# Patient Record
Sex: MALE | Race: WHITE
[De-identification: names, ages, dates, MRNs, and addresses within clinical notes are randomized per-mention and may not be internally consistent; named-entity substitution may affect disease eponyms.]

---

## 2017-03-16 ENCOUNTER — HOSPITAL ENCOUNTER (EMERGENCY)
Dept: HOSPITAL 1 - ED | Age: 62
Discharge: HOME | End: 2017-03-16
Payer: COMMERCIAL

## 2017-03-16 VITALS — WEIGHT: 227.98 LBS | HEIGHT: 68 IN | BODY MASS INDEX: 34.55 KG/M2

## 2017-03-16 VITALS — SYSTOLIC BLOOD PRESSURE: 116 MMHG | DIASTOLIC BLOOD PRESSURE: 81 MMHG

## 2017-03-16 DIAGNOSIS — I10: ICD-10-CM

## 2017-03-16 DIAGNOSIS — K21.9: ICD-10-CM

## 2017-03-16 DIAGNOSIS — L02.415: Primary | ICD-10-CM

## 2017-08-13 ENCOUNTER — HOSPITAL ENCOUNTER (INPATIENT)
Dept: HOSPITAL 1 - ED | Age: 62
LOS: 5 days | Discharge: HOME | DRG: 380 | End: 2017-08-18
Attending: FAMILY MEDICINE | Admitting: FAMILY MEDICINE
Payer: COMMERCIAL

## 2017-08-13 VITALS — SYSTOLIC BLOOD PRESSURE: 93 MMHG | DIASTOLIC BLOOD PRESSURE: 45 MMHG

## 2017-08-13 VITALS — WEIGHT: 210.54 LBS | HEIGHT: 69 IN | BODY MASS INDEX: 31.18 KG/M2

## 2017-08-13 VITALS — DIASTOLIC BLOOD PRESSURE: 51 MMHG | SYSTOLIC BLOOD PRESSURE: 95 MMHG

## 2017-08-13 VITALS — DIASTOLIC BLOOD PRESSURE: 42 MMHG | SYSTOLIC BLOOD PRESSURE: 80 MMHG

## 2017-08-13 VITALS — DIASTOLIC BLOOD PRESSURE: 69 MMHG | SYSTOLIC BLOOD PRESSURE: 108 MMHG

## 2017-08-13 VITALS — SYSTOLIC BLOOD PRESSURE: 86 MMHG | DIASTOLIC BLOOD PRESSURE: 46 MMHG

## 2017-08-13 DIAGNOSIS — I95.9: ICD-10-CM

## 2017-08-13 DIAGNOSIS — M94.0: ICD-10-CM

## 2017-08-13 DIAGNOSIS — K22.10: Primary | ICD-10-CM

## 2017-08-13 DIAGNOSIS — E83.42: ICD-10-CM

## 2017-08-13 DIAGNOSIS — D50.0: ICD-10-CM

## 2017-08-13 DIAGNOSIS — K26.3: ICD-10-CM

## 2017-08-13 DIAGNOSIS — N17.0: ICD-10-CM

## 2017-08-13 DIAGNOSIS — E87.1: ICD-10-CM

## 2017-08-13 DIAGNOSIS — E83.39: ICD-10-CM

## 2017-08-13 DIAGNOSIS — K21.9: ICD-10-CM

## 2017-08-13 LAB
ALBUMIN SERPL-MCNC: 3.5 G/DL (ref 3.4–5)
ALP SERPL-CCNC: 75 U/L (ref 46–116)
ALT SERPL-CCNC: 40 U/L (ref 16–63)
AMPHETAMINES UR QL SCN: (no result)
AMYLASE SERPL-CCNC: 51 U/L (ref 25–115)
AST SERPL-CCNC: 21 U/L (ref 15–37)
BASOPHILS NFR BLD: 0.2 % (ref 0–2)
BILIRUB SERPL-MCNC: 0.37 MG/DL (ref 0.2–1)
BUN SERPL-MCNC: 98 MG/DL (ref 7–18)
CALCIUM SERPL-MCNC: 7.5 MG/DL (ref 8.5–10.1)
CHLORIDE SERPL-SCNC: 88 MMOL/L (ref 98–107)
CHOLEST SERPL-MCNC: 177 MG/DL (ref ?–200)
CHOLEST/HDLC SERPL: 3.7 MG/DL
CO2 SERPL-SCNC: 12.3 MMOL/L (ref 21–32)
CREAT SERPL-MCNC: 8.9 MG/DL (ref 0.7–1.3)
ERYTHROCYTE [DISTWIDTH] IN BLOOD BY AUTOMATED COUNT: 13.4 % (ref 11.5–14.5)
GFR SERPLBLD BASED ON 1.73 SQ M-ARVRAT: 6 ML/MIN
GLUCOSE SERPL-MCNC: 132 MG/DL (ref 74–106)
HDLC SERPL-MCNC: 48 MG/DL (ref 40–60)
MAGNESIUM SERPL-MCNC: 2.5 MG/DL (ref 1.8–2.4)
MICROSCOPIC UR-IMP: YES
PHOSPHATE SERPL-MCNC: 12.2 MG/DL (ref 2.5–4.9)
PLATELET # BLD: 206 X10^3MCL (ref 130–400)
POTASSIUM SERPL-SCNC: 4.3 MMOL/L (ref 3.5–5.1)
PROT SERPL-MCNC: 7.8 G/DL (ref 6.4–8.2)
RBC # UR STRIP.AUTO: (no result) /UL
SODIUM SERPL-SCNC: 127 MMOL/L (ref 136–145)
T3 SERPL-MCNC: 0.87 NG/ML
T3RU NFR SERPL: 38 % UPTAKE (ref 33–39)
T4 FREE SERPL-MCNC: 1.32 NG/DL (ref 0.76–1.46)
T4 SERPL-MCNC: 8.5 UG/DL (ref 4.7–13.3)
T4/T3 UPTAKE INDEX SERPL: 3.2 UG/DL (ref 1.4–4.5)
TRIGL SERPL-MCNC: 242 MG/DL (ref ?–150)
UA SPECIFIC GRAVITY: 1.02 (ref 1–1.03)

## 2017-08-13 PROCEDURE — C9113 INJ PANTOPRAZOLE SODIUM, VIA: HCPCS

## 2017-08-14 VITALS — DIASTOLIC BLOOD PRESSURE: 80 MMHG | SYSTOLIC BLOOD PRESSURE: 127 MMHG

## 2017-08-14 VITALS — SYSTOLIC BLOOD PRESSURE: 94 MMHG | DIASTOLIC BLOOD PRESSURE: 55 MMHG

## 2017-08-14 VITALS — SYSTOLIC BLOOD PRESSURE: 150 MMHG | DIASTOLIC BLOOD PRESSURE: 83 MMHG

## 2017-08-14 VITALS — SYSTOLIC BLOOD PRESSURE: 104 MMHG | DIASTOLIC BLOOD PRESSURE: 64 MMHG

## 2017-08-14 LAB
BASOPHILS NFR BLD: 0.4 % (ref 0–2)
BUN SERPL-MCNC: 87 MG/DL (ref 7–18)
CALCIUM SERPL-MCNC: 6.9 MG/DL (ref 8.5–10.1)
CHLORIDE SERPL-SCNC: 99 MMOL/L (ref 98–107)
CO2 SERPL-SCNC: 16.3 MMOL/L (ref 21–32)
CREAT SERPL-MCNC: 4 MG/DL (ref 0.7–1.3)
ERYTHROCYTE [DISTWIDTH] IN BLOOD BY AUTOMATED COUNT: 13.5 % (ref 11.5–14.5)
GFR SERPLBLD BASED ON 1.73 SQ M-ARVRAT: 16 ML/MIN
GLUCOSE SERPL-MCNC: 96 MG/DL (ref 74–106)
PLATELET # BLD: 155 X10^3MCL (ref 130–400)
POTASSIUM SERPL-SCNC: 3.7 MMOL/L (ref 3.5–5.1)
SODIUM SERPL-SCNC: 133 MMOL/L (ref 136–145)

## 2017-08-15 VITALS — SYSTOLIC BLOOD PRESSURE: 167 MMHG | DIASTOLIC BLOOD PRESSURE: 96 MMHG

## 2017-08-15 VITALS — DIASTOLIC BLOOD PRESSURE: 84 MMHG | SYSTOLIC BLOOD PRESSURE: 139 MMHG

## 2017-08-15 VITALS — SYSTOLIC BLOOD PRESSURE: 154 MMHG | DIASTOLIC BLOOD PRESSURE: 97 MMHG

## 2017-08-15 VITALS — SYSTOLIC BLOOD PRESSURE: 157 MMHG | DIASTOLIC BLOOD PRESSURE: 95 MMHG

## 2017-08-15 VITALS — DIASTOLIC BLOOD PRESSURE: 84 MMHG | SYSTOLIC BLOOD PRESSURE: 140 MMHG

## 2017-08-15 VITALS — DIASTOLIC BLOOD PRESSURE: 102 MMHG | SYSTOLIC BLOOD PRESSURE: 142 MMHG

## 2017-08-15 LAB
ALBUMIN SERPL-MCNC: 2.7 G/DL (ref 3.4–5)
ALP SERPL-CCNC: 73 U/L (ref 46–116)
ALT SERPL-CCNC: 33 U/L (ref 16–63)
AST SERPL-CCNC: 19 U/L (ref 15–37)
BASOPHILS NFR BLD: 0.5 % (ref 0–2)
BILIRUB SERPL-MCNC: 0.43 MG/DL (ref 0.2–1)
BUN SERPL-MCNC: 55 MG/DL (ref 7–18)
CALCIUM SERPL-MCNC: 8.5 MG/DL (ref 8.5–10.1)
CHLORIDE SERPL-SCNC: 104 MMOL/L (ref 98–107)
CO2 SERPL-SCNC: 25.4 MMOL/L (ref 21–32)
CREAT SERPL-MCNC: 1.1 MG/DL (ref 0.7–1.3)
ERYTHROCYTE [DISTWIDTH] IN BLOOD BY AUTOMATED COUNT: 13.8 % (ref 11.5–14.5)
GFR SERPLBLD BASED ON 1.73 SQ M-ARVRAT: > 60 ML/MIN
GLUCOSE SERPL-MCNC: 108 MG/DL (ref 74–106)
MAGNESIUM SERPL-MCNC: 1.9 MG/DL (ref 1.8–2.4)
PHOSPHATE SERPL-MCNC: 2.5 MG/DL (ref 2.5–4.9)
PLATELET # BLD: 203 X10^3MCL (ref 130–400)
POTASSIUM SERPL-SCNC: 3.2 MMOL/L (ref 3.5–5.1)
PROT SERPL-MCNC: 6.4 G/DL (ref 6.4–8.2)
SODIUM SERPL-SCNC: 141 MMOL/L (ref 136–145)

## 2017-08-15 PROCEDURE — 0DB58ZX EXCISION OF ESOPHAGUS, VIA NATURAL OR ARTIFICIAL OPENING ENDOSCOPIC, DIAGNOSTIC: ICD-10-PCS | Performed by: INTERNAL MEDICINE

## 2017-08-15 PROCEDURE — 0DH68UZ INSERTION OF FEEDING DEVICE INTO STOMACH, VIA NATURAL OR ARTIFICIAL OPENING ENDOSCOPIC: ICD-10-PCS | Performed by: INTERNAL MEDICINE

## 2017-08-15 PROCEDURE — 0DB78ZX EXCISION OF STOMACH, PYLORUS, VIA NATURAL OR ARTIFICIAL OPENING ENDOSCOPIC, DIAGNOSTIC: ICD-10-PCS | Performed by: INTERNAL MEDICINE

## 2017-08-16 VITALS — SYSTOLIC BLOOD PRESSURE: 160 MMHG | DIASTOLIC BLOOD PRESSURE: 79 MMHG

## 2017-08-16 VITALS — DIASTOLIC BLOOD PRESSURE: 84 MMHG | SYSTOLIC BLOOD PRESSURE: 173 MMHG

## 2017-08-16 VITALS — DIASTOLIC BLOOD PRESSURE: 60 MMHG | SYSTOLIC BLOOD PRESSURE: 131 MMHG

## 2017-08-16 VITALS — DIASTOLIC BLOOD PRESSURE: 92 MMHG | SYSTOLIC BLOOD PRESSURE: 169 MMHG

## 2017-08-16 VITALS — SYSTOLIC BLOOD PRESSURE: 170 MMHG | DIASTOLIC BLOOD PRESSURE: 96 MMHG

## 2017-08-16 LAB
BASOPHILS NFR BLD: 0 % (ref 0–2)
BUN SERPL-MCNC: 26 MG/DL (ref 7–18)
CALCIUM SERPL-MCNC: 8.5 MG/DL (ref 8.5–10.1)
CHLORIDE SERPL-SCNC: 107 MMOL/L (ref 98–107)
CO2 SERPL-SCNC: 28.9 MMOL/L (ref 21–32)
CREAT SERPL-MCNC: 0.9 MG/DL (ref 0.7–1.3)
ERYTHROCYTE [DISTWIDTH] IN BLOOD BY AUTOMATED COUNT: 13.8 % (ref 11.5–14.5)
GFR SERPLBLD BASED ON 1.73 SQ M-ARVRAT: > 60 ML/MIN
GLUCOSE SERPL-MCNC: 118 MG/DL (ref 74–106)
MAGNESIUM SERPL-MCNC: 1.6 MG/DL (ref 1.8–2.4)
METAMYELOCYTES NFR BLD: 1 % (ref 0–2)
MONOCYTES NFR BLD: 24 % (ref 0–7)
MYELOCYTES NFR BLD: 2 % (ref 0–2)
NEUTS BAND NFR BLD: 7 % (ref 0–10)
NEUTS SEG NFR BLD MANUAL: 41 % (ref 37–75)
PHOSPHATE SERPL-MCNC: 1.6 MG/DL (ref 2.5–4.9)
PLAT MORPH BLD: (no result)
PLATELET # BLD: 219 X10^3MCL (ref 130–400)
POTASSIUM SERPL-SCNC: 3.4 MMOL/L (ref 3.5–5.1)
RBC MORPH BLD: (no result)
SODIUM SERPL-SCNC: 144 MMOL/L (ref 136–145)

## 2017-08-17 VITALS — DIASTOLIC BLOOD PRESSURE: 90 MMHG | SYSTOLIC BLOOD PRESSURE: 164 MMHG

## 2017-08-17 VITALS — SYSTOLIC BLOOD PRESSURE: 179 MMHG | DIASTOLIC BLOOD PRESSURE: 100 MMHG

## 2017-08-17 VITALS — SYSTOLIC BLOOD PRESSURE: 134 MMHG | DIASTOLIC BLOOD PRESSURE: 74 MMHG

## 2017-08-17 VITALS — SYSTOLIC BLOOD PRESSURE: 149 MMHG | DIASTOLIC BLOOD PRESSURE: 83 MMHG

## 2017-08-17 LAB
BASOPHILS NFR BLD: 0.3 % (ref 0–2)
BUN SERPL-MCNC: 19 MG/DL (ref 7–18)
CALCIUM SERPL-MCNC: 8.2 MG/DL (ref 8.5–10.1)
CHLORIDE SERPL-SCNC: 107 MMOL/L (ref 98–107)
CO2 SERPL-SCNC: 25.5 MMOL/L (ref 21–32)
CREAT SERPL-MCNC: 0.9 MG/DL (ref 0.7–1.3)
ERYTHROCYTE [DISTWIDTH] IN BLOOD BY AUTOMATED COUNT: 13.8 % (ref 11.5–14.5)
GFR SERPLBLD BASED ON 1.73 SQ M-ARVRAT: > 60 ML/MIN
GLUCOSE SERPL-MCNC: 131 MG/DL (ref 74–106)
MAGNESIUM SERPL-MCNC: 1.4 MG/DL (ref 1.8–2.4)
PHOSPHATE SERPL-MCNC: 2.2 MG/DL (ref 2.5–4.9)
PLATELET # BLD: 228 X10^3MCL (ref 130–400)
POTASSIUM SERPL-SCNC: 3.4 MMOL/L (ref 3.5–5.1)
SODIUM SERPL-SCNC: 144 MMOL/L (ref 136–145)

## 2017-08-18 VITALS — DIASTOLIC BLOOD PRESSURE: 83 MMHG | SYSTOLIC BLOOD PRESSURE: 134 MMHG

## 2017-08-18 VITALS — DIASTOLIC BLOOD PRESSURE: 71 MMHG | SYSTOLIC BLOOD PRESSURE: 140 MMHG

## 2017-08-18 VITALS — DIASTOLIC BLOOD PRESSURE: 67 MMHG | SYSTOLIC BLOOD PRESSURE: 120 MMHG

## 2017-08-18 LAB
BASOPHILS NFR BLD: 0.2 % (ref 0–2)
BUN SERPL-MCNC: 13 MG/DL (ref 7–18)
CALCIUM SERPL-MCNC: 8.1 MG/DL (ref 8.5–10.1)
CHLORIDE SERPL-SCNC: 103 MMOL/L (ref 98–107)
CO2 SERPL-SCNC: 25.8 MMOL/L (ref 21–32)
CREAT SERPL-MCNC: 0.8 MG/DL (ref 0.7–1.3)
ERYTHROCYTE [DISTWIDTH] IN BLOOD BY AUTOMATED COUNT: 13.9 % (ref 11.5–14.5)
GFR SERPLBLD BASED ON 1.73 SQ M-ARVRAT: > 60 ML/MIN
GLUCOSE SERPL-MCNC: 96 MG/DL (ref 74–106)
PLATELET # BLD: 200 X10^3MCL (ref 130–400)
POTASSIUM SERPL-SCNC: 3.6 MMOL/L (ref 3.5–5.1)
SODIUM SERPL-SCNC: 134 MMOL/L (ref 136–145)

## 2017-08-19 LAB
CK BB CFR SERPL ELPH: 0 %
CK MB CFR SERPL ELPH: 0 % (ref 0–3)
CK MM CFR SERPL ELPH: 100 % (ref 97–100)
MACRO TYPE 1: 0 %
MACRO TYPE 2: 0 %

## 2017-08-28 ENCOUNTER — HOSPITAL ENCOUNTER (INPATIENT)
Dept: HOSPITAL 1 - ED | Age: 62
LOS: 10 days | Discharge: ANOTHER HOSPITAL | DRG: 871 | End: 2017-09-07
Attending: FAMILY MEDICINE | Admitting: FAMILY MEDICINE
Payer: COMMERCIAL

## 2017-08-28 VITALS
BODY MASS INDEX: 31.35 KG/M2 | BODY MASS INDEX: 31.35 KG/M2 | HEIGHT: 69 IN | WEIGHT: 211.64 LBS | HEIGHT: 69 IN | WEIGHT: 211.64 LBS

## 2017-08-28 DIAGNOSIS — A41.01: Primary | ICD-10-CM

## 2017-08-28 DIAGNOSIS — N10: ICD-10-CM

## 2017-08-28 DIAGNOSIS — D47.3: ICD-10-CM

## 2017-08-28 DIAGNOSIS — I37.1: ICD-10-CM

## 2017-08-28 DIAGNOSIS — F11.21: ICD-10-CM

## 2017-08-28 DIAGNOSIS — G06.2: ICD-10-CM

## 2017-08-28 DIAGNOSIS — I51.81: ICD-10-CM

## 2017-08-28 DIAGNOSIS — E78.5: ICD-10-CM

## 2017-08-28 DIAGNOSIS — D73.89: ICD-10-CM

## 2017-08-28 DIAGNOSIS — K76.0: ICD-10-CM

## 2017-08-28 DIAGNOSIS — I10: ICD-10-CM

## 2017-08-28 DIAGNOSIS — M46.26: ICD-10-CM

## 2017-08-28 DIAGNOSIS — E43: ICD-10-CM

## 2017-08-28 DIAGNOSIS — I27.2: ICD-10-CM

## 2017-08-28 DIAGNOSIS — M48.54XA: ICD-10-CM

## 2017-08-28 DIAGNOSIS — B96.5: ICD-10-CM

## 2017-08-28 DIAGNOSIS — Z96.652: ICD-10-CM

## 2017-08-28 DIAGNOSIS — R65.20: ICD-10-CM

## 2017-08-28 DIAGNOSIS — I07.1: ICD-10-CM

## 2017-08-28 DIAGNOSIS — N17.0: ICD-10-CM

## 2017-08-28 DIAGNOSIS — N28.1: ICD-10-CM

## 2017-08-28 DIAGNOSIS — N13.30: ICD-10-CM

## 2017-08-28 DIAGNOSIS — D64.9: ICD-10-CM

## 2017-08-28 DIAGNOSIS — E87.6: ICD-10-CM

## 2017-08-28 DIAGNOSIS — M51.35: ICD-10-CM

## 2017-08-28 DIAGNOSIS — E87.1: ICD-10-CM

## 2017-08-28 PROCEDURE — C1769 GUIDE WIRE: HCPCS

## 2017-08-28 PROCEDURE — C1894 INTRO/SHEATH, NON-LASER: HCPCS

## 2017-08-28 PROCEDURE — C1887 CATHETER, GUIDING: HCPCS

## 2017-08-28 PROCEDURE — A9579 GAD-BASE MR CONTRAST NOS,1ML: HCPCS

## 2017-08-29 VITALS — SYSTOLIC BLOOD PRESSURE: 141 MMHG | DIASTOLIC BLOOD PRESSURE: 98 MMHG

## 2017-08-29 VITALS — DIASTOLIC BLOOD PRESSURE: 106 MMHG | SYSTOLIC BLOOD PRESSURE: 164 MMHG

## 2017-08-29 VITALS — DIASTOLIC BLOOD PRESSURE: 98 MMHG | SYSTOLIC BLOOD PRESSURE: 153 MMHG

## 2017-08-29 VITALS — SYSTOLIC BLOOD PRESSURE: 157 MMHG | DIASTOLIC BLOOD PRESSURE: 91 MMHG

## 2017-08-29 VITALS — SYSTOLIC BLOOD PRESSURE: 147 MMHG | DIASTOLIC BLOOD PRESSURE: 77 MMHG

## 2017-08-29 VITALS — SYSTOLIC BLOOD PRESSURE: 178 MMHG | DIASTOLIC BLOOD PRESSURE: 118 MMHG

## 2017-08-29 LAB
ALBUMIN SERPL-MCNC: 2.2 G/DL (ref 3.4–5)
ALP SERPL-CCNC: 220 U/L (ref 46–116)
ALT SERPL-CCNC: 27 U/L (ref 16–63)
AMPHETAMINES UR QL SCN: (no result)
AMYLASE SERPL-CCNC: 28 U/L (ref 25–115)
AST SERPL-CCNC: 32 U/L (ref 15–37)
BASOPHILS NFR BLD: 0.2 % (ref 0–2)
BILIRUB SERPL-MCNC: 0.6 MG/DL (ref 0.2–1)
BUN SERPL-MCNC: 20 MG/DL (ref 7–18)
BUN SERPL-MCNC: 22 MG/DL (ref 7–18)
CALCIUM SERPL-MCNC: 8.7 MG/DL (ref 8.5–10.1)
CALCIUM SERPL-MCNC: 8.9 MG/DL (ref 8.5–10.1)
CHLORIDE SERPL-SCNC: 98 MMOL/L (ref 98–107)
CHLORIDE SERPL-SCNC: 98 MMOL/L (ref 98–107)
CHOLEST SERPL-MCNC: 107 MG/DL (ref ?–200)
CHOLEST/HDLC SERPL: 17.8 MG/DL
CO2 SERPL-SCNC: 28.3 MMOL/L (ref 21–32)
CO2 SERPL-SCNC: 30.2 MMOL/L (ref 21–32)
CREAT SERPL-MCNC: 0.8 MG/DL (ref 0.7–1.3)
CREAT SERPL-MCNC: 0.9 MG/DL (ref 0.7–1.3)
ERYTHROCYTE [DISTWIDTH] IN BLOOD BY AUTOMATED COUNT: 14.9 % (ref 11.5–14.5)
ERYTHROCYTE [DISTWIDTH] IN BLOOD BY AUTOMATED COUNT: 15.2 % (ref 11.5–14.5)
GFR SERPLBLD BASED ON 1.73 SQ M-ARVRAT: > 60 ML/MIN
GFR SERPLBLD BASED ON 1.73 SQ M-ARVRAT: > 60 ML/MIN
GLUCOSE SERPL-MCNC: 136 MG/DL (ref 74–106)
GLUCOSE SERPL-MCNC: 161 MG/DL (ref 74–106)
HDLC SERPL-MCNC: 6 MG/DL (ref 40–60)
IRON SERPL-MCNC: 13 UG/DL (ref 65–170)
LIPASE SERPL-CCNC: 77 IU/L (ref 73–393)
MAGNESIUM SERPL-MCNC: 1.9 MG/DL (ref 1.8–2.4)
MICROSCOPIC UR-IMP: YES
NEUTS BAND NFR BLD: 5 % (ref 0–10)
NEUTS SEG NFR BLD MANUAL: 91 % (ref 37–75)
PHOSPHATE SERPL-MCNC: 3 MG/DL (ref 2.5–4.9)
PLAT MORPH BLD: (no result)
PLATELET # BLD: 641 X10^3MCL (ref 130–400)
PLATELET # BLD: 764 X10^3MCL (ref 130–400)
POTASSIUM SERPL-SCNC: 3.1 MMOL/L (ref 3.5–5.1)
POTASSIUM SERPL-SCNC: 3.7 MMOL/L (ref 3.5–5.1)
PROT SERPL-MCNC: 6.8 G/DL (ref 6.4–8.2)
RBC # UR STRIP.AUTO: NEGATIVE /UL
RBC MORPH BLD: (no result)
SODIUM SERPL-SCNC: 133 MMOL/L (ref 136–145)
SODIUM SERPL-SCNC: 137 MMOL/L (ref 136–145)
T3 SERPL-MCNC: 0.82 NG/ML
T3RU NFR SERPL: 40 % UPTAKE (ref 33–39)
T4 FREE SERPL-MCNC: 2.02 NG/DL (ref 0.76–1.46)
T4 SERPL-MCNC: 11 UG/DL (ref 4.7–13.3)
T4/T3 UPTAKE INDEX SERPL: 4.4 UG/DL (ref 1.4–4.5)
TIBC SERPL-MCNC: 148 UG/DL (ref 250–450)
TRIGL SERPL-MCNC: 183 MG/DL (ref ?–150)
UA SPECIFIC GRAVITY: 1.02 (ref 1–1.03)

## 2017-08-29 NOTE — NUR
REPORT GIVEN TO FLOOR RN, ALL QUESTIONS ASKED AND ANSWERED PT TO TRANSFER TO
ROOM 256 B IN STABLE CONDITION.

## 2017-08-29 NOTE — NUR
RECEIVED PT. FROM ER VIA JESSICA ACCOMPANIED BY ER NURSE. PT. IS AWAKE, ALERT,
ORIENTED X4. DENIES HEADACHE OR DIZZINESS. BREATH SOUNDS CLEAR THROUGHOUT LUNG
HERRERA, RESP. EVEN, UNLABORED. NO SOB NOTED. DENIES CHESTPAIN, ST. ON MONITOR
#13. NO EDEMA NOTED TO EXTREMITIES. PEDAL PULSES STRONG SCOOBY. ABD. SOFT AND
ROUND, C/O SCOOBY FLANK PAIN, ALSO C/O PAIN THAT EXTENDS DOWN HIS LEG. PRN
MORPHINE GIVEN. WILL MONITOR. IVF NS STARTED AT 100CC/HR. CALL LIGHT PLACED
WITHIN REACH. PT. MADE AWARE NEED FOR URINE SPECIMEN.

## 2017-08-29 NOTE — NUR
PT TO ED FOR C/O RIGHT FLANK PAIN WITH N/V SINCE YESTERDAY, PT STATES THAT PAIN
SHOOTS DOWN BOTH LEGS AND IT FEELS LIKE A SHOOTING PAIN, PT DENIES ANY INUST OT
AREA, PT AAX4 RESP E/U, PT IN NAD NOTED AT THIS TIME

## 2017-08-29 NOTE — NUR
REC'D PT FROM DAY NURSE. PT AAOX4, SPEECH CLEAR, FOLLOWS COMMANDS. C/O SHARP
LOWER BACK PAIN 10/10. MORPHINE 4MG IVP GIVEN BY DAY NURSE. ALSO C/O SHARP
STERNAL CP. ON TELE 13. DENIES DIZZINESS OR PALPITATIONS. NO EDEMA NOTED.
DENIES RESP DISTRESS OR SOB. BREATHING EVEN/UNLABORED ON RA. DENIES ABD PAIN
BUT REPORTS TENDERNESS WITH PALPATION. BOWEL SOUNDS ACTIVE. DENIES N/V. LAST
BM TODAY BUT SMALL/HARD. VOIDING FREELY. GENERALIZED WEAKNESS. SKIN INTACT.
CALL LIGHT WITHIN REACH, BED AT LOWEST POSITION. WILL CONTINUE TO MONITOR.

## 2017-08-29 NOTE — NUR
ASSUMING CARE OF PT. AT THIS TIME. PT AWAKE ALERT AND ORIENTED X 4. C/O LOW
BACK AND SPINE PAIN X 5 DAYS WITH PAIN RADIATING DOWN BILATERAL LEGS. PULSE
MOTOR SENSORY NORMAL TO BILAT LEGS AND FEET. PT CURRENTLY IN POSITION OF
COMFORT. AT THIS TIME.

## 2017-08-29 NOTE — NUR
PT. RECEIVED A TOTAL OF 4MG IVP MORPHINE AND STILL STATES THAT HIS PAIN LEVEL
IS BARELY TOUCHED. HE IS STILL GROANING AND VERY RESTLESS IN THE BED TRYING TO
GET COMFORTABLE. PRN TORADOL ALSO ORDERED AND GIVEN. WILL MONITOR.

## 2017-08-29 NOTE — NUR
HEPARIN INFUSION STARTED PER ORDER. LOADING DOSE 5300 UNITS GIVEN AND INFUSION
STARTED @ 100 UNITS/HR PER PROTOCOL. NEXT PTT ORDERED 8/30 @ 0450. PT EDUCATED
REGARDING THERAPEUTIC BENEFITS AND SIDE EFFECTS. PT VERBALIZED UNDERSTANDING.
WILL CONTINUE TO MONITOR.

## 2017-08-29 NOTE — NUR
PT TRANSFERED TO ROOM 256 B AFTER NEW IV STARTED BY ENA YUNG PT ACCIDENTALLY
PULLED OLD LINE OUT. PT TRANSFERED VIA GURNEY ON MONITOR W/ RN ON MONITOR.

## 2017-08-29 NOTE — NUR
DR. SWANSON MADE AWARE OF TROPONIN 10.118. STAT EKG ORDERED. ALSO INQUIRED IF PT
WOULD STILL BE NPO AND ASKED IF HE WOULD LIKE TO SWITCH FLUIDS IF PT REMAINS
NPO. NO CHANGES IN ORDERS REGARDING DIET AND IVF.

## 2017-08-29 NOTE — NUR
PT ON BED, AWAKE, ALERT, AND ORIENTED. HAS NO COMPLAINT OF PAIN, SOB, OR
DIZZINESS.RESPONDS WELL TO QUESTION AND ANSWER. CLEAR SCOOBY LUNG FIELD,
SYMMETRICAL CHEST EXPANSION AND UNLABORED. ACTIVE BOWEL SOUNDS NOTED. CLEAR
SCOOBY LUNG FIELD, SYMMETRICAL CHEST EXPANSION AND UNLABORED.

## 2017-08-30 VITALS — SYSTOLIC BLOOD PRESSURE: 135 MMHG | DIASTOLIC BLOOD PRESSURE: 102 MMHG

## 2017-08-30 VITALS — SYSTOLIC BLOOD PRESSURE: 147 MMHG | DIASTOLIC BLOOD PRESSURE: 102 MMHG

## 2017-08-30 VITALS — DIASTOLIC BLOOD PRESSURE: 97 MMHG | SYSTOLIC BLOOD PRESSURE: 134 MMHG

## 2017-08-30 VITALS — DIASTOLIC BLOOD PRESSURE: 89 MMHG | SYSTOLIC BLOOD PRESSURE: 123 MMHG

## 2017-08-30 VITALS — DIASTOLIC BLOOD PRESSURE: 99 MMHG | SYSTOLIC BLOOD PRESSURE: 139 MMHG

## 2017-08-30 LAB
BASOPHILS NFR BLD: 0 % (ref 0–2)
BUN SERPL-MCNC: 17 MG/DL (ref 7–18)
BUN SERPL-MCNC: 17 MG/DL (ref 7–18)
CALCIUM SERPL-MCNC: 8.2 MG/DL (ref 8.5–10.1)
CALCIUM SERPL-MCNC: 8.3 MG/DL (ref 8.5–10.1)
CHLORIDE SERPL-SCNC: 93 MMOL/L (ref 98–107)
CHLORIDE SERPL-SCNC: 93 MMOL/L (ref 98–107)
CO2 SERPL-SCNC: 28 MMOL/L (ref 21–32)
CO2 SERPL-SCNC: 31.5 MMOL/L (ref 21–32)
CREAT SERPL-MCNC: 0.8 MG/DL (ref 0.7–1.3)
CREAT SERPL-MCNC: 0.8 MG/DL (ref 0.7–1.3)
ERYTHROCYTE [DISTWIDTH] IN BLOOD BY AUTOMATED COUNT: 14.9 % (ref 11.5–14.5)
GFR SERPLBLD BASED ON 1.73 SQ M-ARVRAT: > 60 ML/MIN
GFR SERPLBLD BASED ON 1.73 SQ M-ARVRAT: > 60 ML/MIN
GLUCOSE SERPL-MCNC: 122 MG/DL (ref 74–106)
GLUCOSE SERPL-MCNC: 130 MG/DL (ref 74–106)
PLATELET # BLD: 541 X10^3MCL (ref 130–400)
POTASSIUM SERPL-SCNC: 2.5 MMOL/L (ref 3.5–5.1)
POTASSIUM SERPL-SCNC: 3 MMOL/L (ref 3.5–5.1)
SODIUM SERPL-SCNC: 133 MMOL/L (ref 136–145)
SODIUM SERPL-SCNC: 133 MMOL/L (ref 136–145)

## 2017-08-30 NOTE — NUR
PT HAD RUN OF RICCI AND OCCASIONAL PAC'S. K 2.5. DR. SWANSON AWARE. ORDERS FOR
POTASSIUM PO AND IV RECEIVED. PT RESTING QUIETLY IN BED WITH EYES CLOSED.

## 2017-08-30 NOTE — NUR
PTT 44.1, REBOLUSED 3600UNITS, INCREASED RATE TO 1300UNITS/HR PER HEPARIN
PROTOCOL. VERIFIED WITH CORINNE YUNG. NEXT PTT ORDERED FOR 1400. NO ACTIVE
BLEEDING NOTED. WILL CONTINUE TO MONITOR.

## 2017-08-30 NOTE — NUR
PT C/O LOWER BACK PAIN 10/10. MORPHINE GIVEN PER ORDER. ALSO REPORTS SHARP CP
WHEN LAYING ON LEFT SIDE BUT DIMINISHES WHEN LAYING ON BACK. WILL CONTINUE TO
MONITOR.

## 2017-08-30 NOTE — NUR
AOX4. TELE #13, SR WITH INVERTED T-WAVE. LUNGS CLEAR AND UNLABORED ON NC @ 2L.
RADIAL AND PEDAL PULSES PALPABLE, NO EDEMA. BOWEL SOUNDS ACTIVE X 4 QUADRANTS,
DENIES NAUSEA. SCABS NOTED TO LEFT KNEE AND BUE. DENIES PAIN AT THIS TIME. NS
INFUSING TO LEFT FA @ 100 ML/HR, NO REDNESS OR SWELLING. SALINE LOCKED TO
RIGHT WRIST. HEPARIN DRIP INFUSING AT 1300 UNITS/HR, NEXT PTT DRAW AT 2015.
BED IN LOW POSITION, CALL LIGHT IN REACH. INSTRUCTED TO CALL FOR ASSISTANCE.

## 2017-08-30 NOTE — NUR
PT IS SLEEPING IN BED IN NO ACUTE DISTRESS. RESPIRATIONS EVEN AND UNLABORED ON
RA. NO PAIN NOTED AT THIS TIME. IVF AND HEPARIN RUNNING AT BEDSIDE. CALL LIGHT
WITHIN REACH. BED IN LOWEST POSITION. WILL CONTINUE TO MONITOR.

## 2017-08-30 NOTE — NUR
PT RESTING IN BED C/O BACK PAIN 8/10. NORCO GIVEN PER ORDER. NO RESP
DISTRESS OR SOB. STILL REPORTS CP WITH MOVEMENT, TOLERABLE AT THIS TIME. PTT
48.6. HEPARIN INFUSION RESUMES AT 1100 UNITS/HR PER PROTOCOL. NEXT PTT @ 0850.
, NO COVERAGE. PT REMAINS NPO FOR POSSIBLE INTERVENTION. WILL ENDORSE TO
DAY NURSE.

## 2017-08-30 NOTE — NUR
PT IS SITTING UP IN BED IN NO ACUTE DISTRESS. GIVEN MORPHINE FOR STERNAL AND
BACK PAIN. HEPARIN AND IVF INFUSING AT BEDSIDE. BED IS IN LOWEST POSITION.
CALL LIGHT WITHIN REACH. WILL ENDORSE TO NIGHT NURSE.

## 2017-08-30 NOTE — NUR
PTT 50.8. NO CHANGES TO HEPARIN INFUSION PER HEPARIN PROTOCOL. STILL INFUSING
AT 1300 UNITS/HR. NEXT PTT ORDERED FOR 2015. NO SIGNS OF ACTIVE BLEEDING
NOTED. WILL CONTINUE TO MONITOR.

## 2017-08-30 NOTE — NUR
PT RECEIVED FROM NIGHT NURSE IN NO ACUTE DISTRESS. RESPIRATIONS EVEN AND
UNLABORED ON RA. PT IS AAOX4, STATES PAIN TO BACK AND STERNUM IS TOLERABLE AT
THIS TIME. IVF AND HEPARIN RUNNING AT BEDSIDE. BED IN LOWEST POSITION, CALL
LIGHT WITHIN REACH. WILL CONTINUE TO MONITOR.

## 2017-08-31 VITALS — DIASTOLIC BLOOD PRESSURE: 99 MMHG | SYSTOLIC BLOOD PRESSURE: 142 MMHG

## 2017-08-31 VITALS — DIASTOLIC BLOOD PRESSURE: 55 MMHG | SYSTOLIC BLOOD PRESSURE: 108 MMHG

## 2017-08-31 VITALS — SYSTOLIC BLOOD PRESSURE: 108 MMHG | DIASTOLIC BLOOD PRESSURE: 101 MMHG

## 2017-08-31 VITALS — DIASTOLIC BLOOD PRESSURE: 82 MMHG | SYSTOLIC BLOOD PRESSURE: 127 MMHG

## 2017-08-31 VITALS — DIASTOLIC BLOOD PRESSURE: 73 MMHG | SYSTOLIC BLOOD PRESSURE: 113 MMHG

## 2017-08-31 VITALS — DIASTOLIC BLOOD PRESSURE: 94 MMHG | SYSTOLIC BLOOD PRESSURE: 148 MMHG

## 2017-08-31 LAB
BASOPHILS NFR BLD: 0 % (ref 0–2)
BUN SERPL-MCNC: 24 MG/DL (ref 7–18)
CALCIUM SERPL-MCNC: 8.4 MG/DL (ref 8.5–10.1)
CHLORIDE SERPL-SCNC: 96 MMOL/L (ref 98–107)
CO2 SERPL-SCNC: 27 MMOL/L (ref 21–32)
CREAT SERPL-MCNC: 1 MG/DL (ref 0.7–1.3)
ERYTHROCYTE [DISTWIDTH] IN BLOOD BY AUTOMATED COUNT: 15.5 % (ref 11.5–14.5)
GFR SERPLBLD BASED ON 1.73 SQ M-ARVRAT: > 60 ML/MIN
GLUCOSE SERPL-MCNC: 104 MG/DL (ref 74–106)
MONOCYTES NFR BLD: 5 % (ref 0–7)
NEUTS BAND NFR BLD: 4 % (ref 0–10)
NEUTS SEG NFR BLD MANUAL: 83 % (ref 37–75)
PLAT MORPH BLD: (no result)
PLATELET # BLD: 452 X10^3MCL (ref 130–400)
POTASSIUM SERPL-SCNC: 3.6 MMOL/L (ref 3.5–5.1)
RBC MORPH BLD: (no result)
SODIUM SERPL-SCNC: 133 MMOL/L (ref 136–145)
TARGETS BLD QL SMEAR: (no result)

## 2017-08-31 NOTE — NUR
RECEIVED PT IN NO ACUTE DISTRESS. PT SLEEPING IN BED, NO PAIN NOTED AT THIS
TIME. IVF AND HEPARIN RUNNING AT BEDSIDE. BED IN LOWEST POSITION. CALL LIGHT
WITHIN REACH. WILL CONTINUE TO MONITOR.

## 2017-08-31 NOTE — NUR
PT CURRENTLY RESTING IN BED, NO ACUTE DISTRESS. A/O X4. TELE #13 SHOWING
SINUS RHYTHM WITH PAC'S AND PVC'S, DENIES CHEST PAIN AT THIS TIME. PULSES
PALPABLE IN ALL EXTREMITIES, NO EDEMA NOTED. LUNG SOUNDS CTA BILATERALLY,
NO RESPIRATORY DISTRESS NOTED. BOWEL SOUNDS ACTIVE, LAST BM 8/29/17. VOIDING
WELL. MILD GENERALIZED WEAKNESS NOTED. MULTIPLE DRY SCABS TO BUE AND BLE.
DENIES PAIN AT THIS TIME. IV PATENT AND INTACT. BED IN LOWEST POSITION, SIDE
RAILS UP X2, CALL LIGHT WITHIN REACH. WILL CONTINUE TO MONITOR.

## 2017-08-31 NOTE — NUR
PTT WAS 38.2. REBOLUSED 3600 UNITS. HEPARIN DRIP INCREASED TO 1500 UNITS/HR
PER HEPARIN PROTOCOL. NEXT PTT ORDERED FOR 1230. VERIFIED BY CORINNE YUNG. NO
SIGNS OF ACTIVE BLEEDING. WILL CONTINUE TO MONITOR.

## 2017-08-31 NOTE — NUR
PT RESUMED ON HEPARIN DRIP PROTOCOL PER DR YANES, RUNNING AT 1500 UNITS/HR.
NEXT PTT ORDERED FOR 1700. NO ACTIVE BLEEDING NOTED. DILAUDID GIVEN AS
SCHEDULED.

## 2017-08-31 NOTE — NUR
PT SITTING UP IN BED IN NO ACUTE DISTRESS, APPEARS COMFORTABLE AT THIS TIME.
AAOX4. RESPIRATIONS EVEN AND UNLABORED ON 2L O2 VIA NC. IVF AND HEPARIN
RUNNING AT BEDSIDE. BED IN LOWEST POSITION, CALL LIGHT WITHIN REACH. WILL
CONTINUE TO MONITOR.

## 2017-08-31 NOTE — NUR
PT SITTING UP IN BED IN NO ACUTE DISTRESS. AAOX4. MORPHINE GIVEN FOR PAIN TO
HIPS, BACK, AND STERNUM. IVF AND HEPARIN RUNNING AT BEDSIDE. BED IN LOWEST
POSITION. CALL LIGHT WITHIN REACH. WILL ENDORSE TO NIGHT NURSE.

## 2017-09-01 VITALS — SYSTOLIC BLOOD PRESSURE: 128 MMHG | DIASTOLIC BLOOD PRESSURE: 84 MMHG

## 2017-09-01 VITALS — DIASTOLIC BLOOD PRESSURE: 70 MMHG | SYSTOLIC BLOOD PRESSURE: 105 MMHG

## 2017-09-01 VITALS — DIASTOLIC BLOOD PRESSURE: 71 MMHG | SYSTOLIC BLOOD PRESSURE: 108 MMHG

## 2017-09-01 VITALS — SYSTOLIC BLOOD PRESSURE: 128 MMHG | DIASTOLIC BLOOD PRESSURE: 91 MMHG

## 2017-09-01 VITALS — SYSTOLIC BLOOD PRESSURE: 103 MMHG | DIASTOLIC BLOOD PRESSURE: 64 MMHG

## 2017-09-01 LAB
BASOPHILS NFR BLD: 0 % (ref 0–2)
BUN SERPL-MCNC: 26 MG/DL (ref 7–18)
BURR CELLS BLD QL SMEAR: (no result)
CALCIUM SERPL-MCNC: 8.2 MG/DL (ref 8.5–10.1)
CHLORIDE SERPL-SCNC: 96 MMOL/L (ref 98–107)
CO2 SERPL-SCNC: 29.9 MMOL/L (ref 21–32)
CREAT SERPL-MCNC: 1.1 MG/DL (ref 0.7–1.3)
ERYTHROCYTE [DISTWIDTH] IN BLOOD BY AUTOMATED COUNT: 15.1 % (ref 11.5–14.5)
GFR SERPLBLD BASED ON 1.73 SQ M-ARVRAT: > 60 ML/MIN
GLUCOSE SERPL-MCNC: 104 MG/DL (ref 74–106)
MONOCYTES NFR BLD: 5 % (ref 0–7)
NEUTS BAND NFR BLD: 2 % (ref 0–10)
NEUTS SEG NFR BLD MANUAL: 84 % (ref 37–75)
PLAT MORPH BLD: (no result)
PLATELET # BLD: 386 X10^3MCL (ref 130–400)
POTASSIUM SERPL-SCNC: 3.5 MMOL/L (ref 3.5–5.1)
RBC MORPH BLD: (no result)
SODIUM SERPL-SCNC: 132 MMOL/L (ref 136–145)
TARGETS BLD QL SMEAR: (no result)

## 2017-09-01 PROCEDURE — B2111ZZ FLUOROSCOPY OF MULTIPLE CORONARY ARTERIES USING LOW OSMOLAR CONTRAST: ICD-10-PCS | Performed by: INTERNAL MEDICINE

## 2017-09-01 PROCEDURE — 4A023N7 MEASUREMENT OF CARDIAC SAMPLING AND PRESSURE, LEFT HEART, PERCUTANEOUS APPROACH: ICD-10-PCS | Performed by: INTERNAL MEDICINE

## 2017-09-01 PROCEDURE — B3101ZZ FLUOROSCOPY OF THORACIC AORTA USING LOW OSMOLAR CONTRAST: ICD-10-PCS | Performed by: INTERNAL MEDICINE

## 2017-09-01 PROCEDURE — B2151ZZ FLUOROSCOPY OF LEFT HEART USING LOW OSMOLAR CONTRAST: ICD-10-PCS | Performed by: INTERNAL MEDICINE

## 2017-09-01 NOTE — NUR
ASPIRATED THE REST OF 6 ML OF AIR REMAINING.NO BLEEDING NOTED ON THE
SITE.INSTRUCTED PT TO NOTIFY AT ONCE IF BLEEDING NOTED.WILL CONTINUE TO
MONITOR PT.

## 2017-09-01 NOTE — NUR
PT SLEPT PERIODICALLY THROUGHOUT NIGHT, NO ACUTE DISTRESS. ALL NEEDS MET AND
ATTENDED TO. NO SIGNIFICANT CHANGES. IV PATENT AND INTACT. MEDICATED PAIN PER
EMAR. BED IN LOWEST POSITION, SIDE RAILS UP X2, CALL LIGHT WITHIN REACH. WILL
ENDORSE CARE TO ONCOMING NURSE.

## 2017-09-01 NOTE — NUR
BACK FR.CATH LAB S/P CARDIAC CATH.AAO X4.TR BAND ON R  WRIST ON WITH ARM
SPLINT. V/S STABLE.WILL BEGIN ASPIRATING AIR AT 1030.CALL LIGHT WITHIN
REACH.WILL CONTINUE TO MONITOR PT.

## 2017-09-01 NOTE — NUR
SHIFT REASSESSMENT DONE.PATIENT ALERT AND ORIENTED.NOT IN RESP
DISTRESS.BREATHING EASY.MOVING ALL EXT WELL.HEPLOCK INTACT,ATB AS
SCHEDULED.TELE 13 SR.SKIN INTACT.VOIDING WELL.CALL LIGHT IN REACH.

## 2017-09-02 VITALS — DIASTOLIC BLOOD PRESSURE: 56 MMHG | SYSTOLIC BLOOD PRESSURE: 108 MMHG

## 2017-09-02 VITALS — SYSTOLIC BLOOD PRESSURE: 109 MMHG | DIASTOLIC BLOOD PRESSURE: 65 MMHG

## 2017-09-02 VITALS — DIASTOLIC BLOOD PRESSURE: 65 MMHG | SYSTOLIC BLOOD PRESSURE: 109 MMHG

## 2017-09-02 VITALS — DIASTOLIC BLOOD PRESSURE: 65 MMHG | SYSTOLIC BLOOD PRESSURE: 111 MMHG

## 2017-09-02 VITALS — DIASTOLIC BLOOD PRESSURE: 67 MMHG | SYSTOLIC BLOOD PRESSURE: 111 MMHG

## 2017-09-02 VITALS — SYSTOLIC BLOOD PRESSURE: 109 MMHG | DIASTOLIC BLOOD PRESSURE: 69 MMHG

## 2017-09-02 LAB
BASOPHILS NFR BLD: 0 % (ref 0–2)
BUN SERPL-MCNC: 28 MG/DL (ref 7–18)
BURR CELLS BLD QL SMEAR: (no result)
CALCIUM SERPL-MCNC: 8.5 MG/DL (ref 8.5–10.1)
CHLORIDE SERPL-SCNC: 96 MMOL/L (ref 98–107)
CO2 SERPL-SCNC: 25.8 MMOL/L (ref 21–32)
CREAT SERPL-MCNC: 1.1 MG/DL (ref 0.7–1.3)
ERYTHROCYTE [DISTWIDTH] IN BLOOD BY AUTOMATED COUNT: 15.2 % (ref 11.5–14.5)
GFR SERPLBLD BASED ON 1.73 SQ M-ARVRAT: > 60 ML/MIN
GLUCOSE SERPL-MCNC: 102 MG/DL (ref 74–106)
IRON SERPL-MCNC: 59 UG/DL (ref 65–170)
METAMYELOCYTES NFR BLD: 3 % (ref 0–2)
MONOCYTES NFR BLD: 3 % (ref 0–7)
MYELOCYTES NFR BLD: 1 % (ref 0–2)
NEUTS BAND NFR BLD: 3 % (ref 0–10)
NEUTS SEG NFR BLD MANUAL: 77 % (ref 37–75)
PLAT MORPH BLD: (no result)
PLATELET # BLD: 443 X10^3MCL (ref 130–400)
POTASSIUM SERPL-SCNC: 3.8 MMOL/L (ref 3.5–5.1)
RBC # BLD AUTO: 3.08 M/MM3 (ref 4.52–5.9)
RBC MORPH BLD: (no result)
SODIUM SERPL-SCNC: 131 MMOL/L (ref 136–145)
TARGETS BLD QL SMEAR: (no result)
TIBC SERPL-MCNC: 190 UG/DL (ref 250–450)

## 2017-09-02 NOTE — NUR
IV ACCIDENTALLY PULLED OUT WHILE PT WAS TURNING. CLEANED PT UP. CHANGED LINENS
AND GOWN. C/O BACK PAIN 10/10. TORADOL GIVEN PER ORDER. WILL MONTIOR FOR
RELIEF.

## 2017-09-02 NOTE — NUR
MEDICAL ROUNDS WERE DONE WITH DR ROJAS AND THE MEDICINE TEAM AT 0843. THE PLAN
FOR TODAY IS TO GET A K PAD FOR HIS BACK PAIN AND GET PHYSICAL THERAPY, AND A
MUSCLE RELAXER. ALSO POSSIBLE CONSULTATION WITH INFECTION CONTROL DR, THEY ARE
AWARE OF HIS AM LABS WITH WBC OF 23.3.

## 2017-09-02 NOTE — NUR
REC'D PT FROM DAY NURSE. AAOX4, SPEECH CLEAR, FOLLOWS COMMANDS. PT C/O BACK
PAIN 8/10. ROBAXIN GIVEN PER ORDER. ON TELE 13. DENIES CP, DIZINESS, OR
PALPITATIONS. NO RESP DISTRESS OR SOB. BREATHING EVEN/UNLABORED ON RA. NO
EDEMA NOTED. DENIES ABD PAIN BUT REPORTS TENDERNESS WITH PALPATION. NO N/V.
VOIDING FREELY. REPORTS DYURIA AT TIMES. K PAD TO BACK IN PLACE. AMBULATORY.
SCABS TO L KNEE CECILIA. CALL LIGHT WITHIN REACH, BED AT LOWEST POSITION. WILL
CONTINUE TO MONITOR.

## 2017-09-02 NOTE — NUR
AAO TIMES 4. TLEE # 13 SR WITH OCCASIONAL PACING. NO C/O PAIN. NO SOB. IV SITE
TO LFA AND RFA CDI, BOTH PATENT. COOPERATIVE.

## 2017-09-02 NOTE — NUR
PATIENT I AND O MEASURED.ATB AS SCHEDULED.SPLINT ON HIS R WRIST INTACT.WILL DC
AT 10 AM.WILL ENDORSE TO INCOMING SHIFT.

## 2017-09-02 NOTE — NUR
AAO TIMES 4. TELE # 13 SR WITH OCCASIONAL PACING. LUNGS CTA. NO SOB. O2 SAT ON
RA 96%. BS'S ACTIVE TIMES 4. PERIPHERAL PULSES PALPABLE. +1 EDEMA BLE.  SCD
BLE. MEDICATED WITH NORCO PO AT 0807 FOR C/O RIGHT LOWER BACK PAIN THAT SHOOTS
DOWN RIGHT LEG, I ALSO REPOSITIONED HIM FOR COMFORT. COOPERATIVE.

## 2017-09-03 VITALS — SYSTOLIC BLOOD PRESSURE: 111 MMHG | DIASTOLIC BLOOD PRESSURE: 58 MMHG

## 2017-09-03 VITALS — SYSTOLIC BLOOD PRESSURE: 111 MMHG | DIASTOLIC BLOOD PRESSURE: 63 MMHG

## 2017-09-03 VITALS — DIASTOLIC BLOOD PRESSURE: 76 MMHG | SYSTOLIC BLOOD PRESSURE: 120 MMHG

## 2017-09-03 VITALS — SYSTOLIC BLOOD PRESSURE: 119 MMHG | DIASTOLIC BLOOD PRESSURE: 65 MMHG

## 2017-09-03 VITALS — SYSTOLIC BLOOD PRESSURE: 99 MMHG | DIASTOLIC BLOOD PRESSURE: 58 MMHG

## 2017-09-03 LAB
BASOPHILS NFR BLD: 0 % (ref 0–2)
BUN SERPL-MCNC: 23 MG/DL (ref 7–18)
CALCIUM SERPL-MCNC: 8.2 MG/DL (ref 8.5–10.1)
CHLORIDE SERPL-SCNC: 96 MMOL/L (ref 98–107)
CO2 SERPL-SCNC: 23.5 MMOL/L (ref 21–32)
CREAT SERPL-MCNC: 1.1 MG/DL (ref 0.7–1.3)
ERYTHROCYTE [DISTWIDTH] IN BLOOD BY AUTOMATED COUNT: 15 % (ref 11.5–14.5)
GFR SERPLBLD BASED ON 1.73 SQ M-ARVRAT: > 60 ML/MIN
GLUCOSE SERPL-MCNC: 125 MG/DL (ref 74–106)
METAMYELOCYTES NFR BLD: 2 % (ref 0–2)
MONOCYTES NFR BLD: 4 % (ref 0–7)
MYELOCYTES NFR BLD: 1 % (ref 0–2)
NEUTS BAND NFR BLD: 0 % (ref 0–10)
NEUTS SEG NFR BLD MANUAL: 83 % (ref 37–75)
PLAT MORPH BLD: (no result)
PLATELET # BLD: 566 X10^3MCL (ref 130–400)
POTASSIUM SERPL-SCNC: 3.7 MMOL/L (ref 3.5–5.1)
RBC MORPH BLD: (no result)
SODIUM SERPL-SCNC: 130 MMOL/L (ref 136–145)
TARGETS BLD QL SMEAR: (no result)
TRANSFERRIN SERPL-MCNC: 163 MG/DL (ref 200–370)

## 2017-09-03 NOTE — NUR
Nutrition Note
 
Nutrition Consult: Severe malnutrition via labs; Please talk with pt with
proper PO intakes.
Thank you for your consult.
 
Dx: Back Pain, Pyelonephritis
PMHx: HTN, HLD
Ht: 69", 5' 9". Wt: 211 lb, 96 kg. BMI: 31.3 kg/m2 (Obesity Class I)
Labs: Na 130 L,  H, BUN 23 H, WBC 27.4 H, H/H 9.8/30 L; (8/31) Troponin
4.528 H; (8/29) ALB 2.2 L, A1C 5.5
Current Diet Order: 2 gm Sodium
PO Intakes: (9/2) B: 25%, L: 25%, D: 50%
Skin: Intact. Kingsley 20. No pressure injuries noted.
 
Pt found with sepsis likely secondary to pyelonephritis, epigastric and chest
pain with radiation into back per doctor's notes. Per doctor's progress note
9/2, pt continues to have bacteremia with elevated WBC count 23.5, Dr. Wright
consulted per Dr. Abdul's recommendation, Dr. Abdul added IV Vancomycin, pt
admitted to generalized body pain which has been chronic since admission, pt
admits to joint pain, back pain, nausea, and good appetite.
Pt was seen resting in bed, lunch tray seen at bedside, just arrived, however,
untouched. Pt stated he is in a lot of pain which is affecting his appetite at
this time. Pt appears to be comfortable, however, did state that he is in
pain. Pt stated no significant weight loss within the past 6 months, 
lb and has been stable. RD encouraged adequate PO intakes, and emphasized
drinking liquids is easier compared to chewing foods, pt verbalizes
understanding. Pt also agreeable to Boost x1 daily to aid in PO intakes and
weight maintenance. Spoke with Dr. Lowe regarding recommendations, doctor
agreeable.
 
Intervention:
1. Continue 2 gm Na diet per doctor.
2. Recommend Boost x1 daily (240 kcal, 10 gm protein) to aid in PO intakes and
weight maintenance.
 
Review indicates pt is a LOW risk priority at this time. RD to follow up per
nutrition care policy and standards. Please contact RD should nutrition
concerns arise earlier than expected follow up date. Pt will be F/U as LOW
risk (9/10).

## 2017-09-03 NOTE — NUR
PT RESTING IN BED WITH EYES CLOSED. LAYING ON R SIDE. NO SIGNS OF DISTRESS
NOTED. BREATHING EVEN/UNLABORED ON RA. CALL LIGHT WITHIN REACH, BED AT LOWEST
POSITION. WILL CONTINUE TO MONITOR.

## 2017-09-03 NOTE — NUR
PT A/A/O X4. DENIES DIZZINESS AND HEADACHE. BREATH SOUNDS CLEAR. BREATHING
EVEN AND UNLABORED ON ROOM AIR. DENIES CHEST PAIN AND PRESSURE THUS FAR. BOWEL
SOUNDS ACTIVE. NO C/O N/V AND ABD PAIN. C/O BACK PAIN. GAVE PT NORCO PO. PT
TOLERATED IT WELL. SCOOBY PEDAL PITTING EDEMA NOTED ON SCOOBY FEET. IV INTACT ON THE
LEFT HAND. MADE PT COMFORTABLE. PLACED CALL LIGHT WITH IN REACH. WILL CONTINUE
TO MONITOR.

## 2017-09-03 NOTE — NUR
1. Continue 2 gm Na diet per doctor.
2. Recommend Boost x1 daily (240 kcal, 10 gm protein) to aid in PO intakes and
weight maintenance.

## 2017-09-03 NOTE — NUR
PT STILL IN PAIN. REQUESTING NORCO. NORCO ORDER . DR. JIM MADE AWARE
VIA [facility].  REQUESTED TO REORDER. WILL HAVE DAY NURSE FOLLOW UP. PT DENIED
ROBAXIN. WILL GIVE TORADOL WHEN DUE.

## 2017-09-03 NOTE — NUR
AAO TIMES 4. TELE # 13 SR. VS'S STABLE. NO SOB. LUNGS CTA, RLL WITH FINE
CRACKLES. O2 SAT ON RA 93%. BS'S ACTIVE TIMES 4. DAVIDSON WITH GENERALIZED
WEAKNESS. PERIPHERAL PULSES PALPABLE. +1 EDEMA BLE. SCD BLE. COOPERATIVE.

## 2017-09-03 NOTE — NUR
AAO TIMES 4. TELE # 13 SR. VS'S STABLE. NO SOB. PT HAD 3 LOOSE STOOLS TODAY,
IT WAS SENT TO LAB AND WAS NEGATIVE FOR C DIFF. NO C/O PAIN AT THIS TIME. IV
SITE TO LFA BLEEDING, STOPPED THE IV AND ABELARDO YUNG WILL RESTART ANOTHER IV.

## 2017-09-04 VITALS — SYSTOLIC BLOOD PRESSURE: 106 MMHG | DIASTOLIC BLOOD PRESSURE: 52 MMHG

## 2017-09-04 VITALS — SYSTOLIC BLOOD PRESSURE: 116 MMHG | DIASTOLIC BLOOD PRESSURE: 61 MMHG

## 2017-09-04 VITALS — DIASTOLIC BLOOD PRESSURE: 55 MMHG | SYSTOLIC BLOOD PRESSURE: 112 MMHG

## 2017-09-04 VITALS — SYSTOLIC BLOOD PRESSURE: 121 MMHG | DIASTOLIC BLOOD PRESSURE: 66 MMHG

## 2017-09-04 VITALS — SYSTOLIC BLOOD PRESSURE: 117 MMHG | DIASTOLIC BLOOD PRESSURE: 59 MMHG

## 2017-09-04 LAB
BASOPHILS NFR BLD: 0 % (ref 0–2)
BUN SERPL-MCNC: 16 MG/DL (ref 7–18)
CALCIUM SERPL-MCNC: 8.1 MG/DL (ref 8.5–10.1)
CHLORIDE SERPL-SCNC: 96 MMOL/L (ref 98–107)
CO2 SERPL-SCNC: 25.1 MMOL/L (ref 21–32)
CREAT SERPL-MCNC: 0.9 MG/DL (ref 0.7–1.3)
ERYTHROCYTE [DISTWIDTH] IN BLOOD BY AUTOMATED COUNT: 15.3 % (ref 11.5–14.5)
GFR SERPLBLD BASED ON 1.73 SQ M-ARVRAT: > 60 ML/MIN
GLUCOSE SERPL-MCNC: 101 MG/DL (ref 74–106)
MONOCYTES NFR BLD: 6 % (ref 0–7)
NEUTS BAND NFR BLD: 0 % (ref 0–10)
NEUTS SEG NFR BLD MANUAL: 92 % (ref 37–75)
PLAT MORPH BLD: (no result)
PLATELET # BLD: 534 X10^3MCL (ref 130–400)
POTASSIUM SERPL-SCNC: 3.8 MMOL/L (ref 3.5–5.1)
RBC MORPH BLD: (no result)
SODIUM SERPL-SCNC: 129 MMOL/L (ref 136–145)

## 2017-09-04 NOTE — NUR
PT RESTING WITH EYES CLOSED. NO DISTRESS AND DISCOMFORT NOTED. IV INTACT AND
INFUSING AS ORDERED. WILL ENDORSE TO THE AM NURSE ACCORDINGLY.

## 2017-09-04 NOTE — NUR
LATE ENTRY: 2000H - AWAKE AND ALERT, ORIENTED X 4. SPEECH CLEAR AND
APPROPRIATE. BREATHING EVEN AND UNLABORED. IVF OF NS AT TKO RATE. ON NAFCILLIN
IVPB Q 4 HRS. STATED HAVING 10/10 PAIN TO BACK. STATED STARTED A FEW DAYS AGO,
DENIES HAVING FALLEN, BUT STATED HE MUST HAVE TWISTED HIS BACK WHILE HE WAS
ASLEEP. FULL AND ACTIVE ROM ALL EXTREMITIES. CALL LIGHT WITHIN EASY REACH.

## 2017-09-04 NOTE — NUR
AM ROUNDS DONE BY DR. LAWRENCE AND MEDICAL TEAM, PLAN FOR PAIN MANAGEMENT AND
POSSIBLE TRANSFER TO HIGHER LEVEL OF CARE. PT AGREED WITH PLAN. CONCERNS
ADDRESSED.

## 2017-09-04 NOTE — NUR
RECEIVED AWAKE, ALERT AND ORIENTED. IN NO RESP. DISTRESS. C/O BACK SPASMS,
MEDICATED WITH ROBAXIN AS PER PRN ORDER. PT REFUSED HEATING PAD. IVF INFUSING
TO KVO AND SITE CLEAR. CA.CALL LIGHT WITHIN REACH. WILL CONTINUE WITH PLAN
OF CARE.

## 2017-09-04 NOTE — NUR
PT C/O BACK PAIN. DENIES CHEST PAIN THUS FAR. GAVE PT NORCO PO. PT TOLERATED
IT WELL. WILL CONTINUE TO MONITOR.

## 2017-09-04 NOTE — NUR
PT C/O BACK PAIN 10/10, MEDICATED WITH DILAUDID IVP AS PER ORDER, IN NO
DISTRESS. FAMILY AT BEDSIDE. WILL CONTINUE TO MONITOR.

## 2017-09-04 NOTE — NUR
PT C/O CHEST PAIN AND WANTED TO SPEAK WITH THE DOCTOR. DR. MAYA SPOKE
WITH THE PT AND NEW ORDERS GIVEN. GAVE PT NITROGLYCERINE PO. PT TOLERATED
IT WELL. WILL CONTINUE TO MONITOR.

## 2017-09-04 NOTE — NUR
RESTING IN NO DISTRESS, NO C/O PAIN AT THIS TIME. IVF INFUSING TO KVO AND SITE
CLEAR. CALL LIGHT WITHIN REACH. WILL BE ENDORSED TO INCOMING SHIFT.

## 2017-09-05 VITALS — DIASTOLIC BLOOD PRESSURE: 63 MMHG | SYSTOLIC BLOOD PRESSURE: 119 MMHG

## 2017-09-05 VITALS — SYSTOLIC BLOOD PRESSURE: 102 MMHG | DIASTOLIC BLOOD PRESSURE: 52 MMHG

## 2017-09-05 VITALS — DIASTOLIC BLOOD PRESSURE: 62 MMHG | SYSTOLIC BLOOD PRESSURE: 113 MMHG

## 2017-09-05 VITALS — DIASTOLIC BLOOD PRESSURE: 48 MMHG | SYSTOLIC BLOOD PRESSURE: 110 MMHG

## 2017-09-05 LAB
ALBUMIN SERPL-MCNC: 1.8 G/DL (ref 3.4–5)
ALP SERPL-CCNC: 104 U/L (ref 46–116)
ALT SERPL-CCNC: 31 U/L (ref 16–63)
AST SERPL-CCNC: 30 U/L (ref 15–37)
BASOPHILS NFR BLD: 0 % (ref 0–2)
BILIRUB DIRECT SERPL-MCNC: 0.28 MG/DL (ref 0–0.2)
BILIRUB SERPL-MCNC: 0.7 MG/DL (ref 0.2–1)
BUN SERPL-MCNC: 13 MG/DL (ref 7–18)
CALCIUM SERPL-MCNC: 8.1 MG/DL (ref 8.5–10.1)
CHLORIDE SERPL-SCNC: 97 MMOL/L (ref 98–107)
CO2 SERPL-SCNC: 26.1 MMOL/L (ref 21–32)
CREAT SERPL-MCNC: 0.8 MG/DL (ref 0.7–1.3)
DACRYOCYTES BLD QL SMEAR: (no result)
ERYTHROCYTE [DISTWIDTH] IN BLOOD BY AUTOMATED COUNT: 14.9 % (ref 11.5–14.5)
GFR SERPLBLD BASED ON 1.73 SQ M-ARVRAT: > 60 ML/MIN
GGT SERPL-CCNC: 79 U/L (ref 5–85)
GLUCOSE SERPL-MCNC: 106 MG/DL (ref 74–106)
MONOCYTES NFR BLD: 7 % (ref 0–7)
NEUTS BAND NFR BLD: 8 % (ref 0–10)
NEUTS SEG NFR BLD MANUAL: 72 % (ref 37–75)
PLAT MORPH BLD: (no result)
PLATELET # BLD: 611 X10^3MCL (ref 130–400)
POTASSIUM SERPL-SCNC: 3.8 MMOL/L (ref 3.5–5.1)
PROT SERPL-MCNC: 6.3 G/DL (ref 6.4–8.2)
RBC MORPH BLD: (no result)
SCHISTOCYTES BLD QL SMEAR: (no result)
SODIUM SERPL-SCNC: 129 MMOL/L (ref 136–145)

## 2017-09-05 NOTE — NUR
PT. BACK FROM MRI PER W/C ,DENIES ANY PAIN. DINNER SERVED ATE FAIRLY. MADE PT.
COMFORTABLE IN BED. CALL LIGHT W/ IN REACH.

## 2017-09-05 NOTE — NUR
AWAKE AND ALERT, ORIENTED X 4. SPEECH CLEAR AND APPROPRIATE. LYING ON HIS LEFT
SIDE. STATED THIS IS THE ONLY POSITION HE IS COMFORTABLE WITH. BREATHING EVEN
AND UNLABORED. CALL LIGHT WITHIN EASY REACH. REINFORCED NEED TO USE CALL LIGHT
TO CALL FOR ASSISTANCE.

## 2017-09-05 NOTE — NUR
**********PHYSICAL THERAPY DAILY NOTES CO-SIGN**********
All documentation done by the Physical Therapy Assistant for 09/05/17
has been reviewed. I agree with the documentation.
 
Reviewed/Co-Signed by: Gerardo Beltran   PT
Documentation Done by: ALBERTO HOWELL PTA
 
PT EDUCATED ON SAFETY FROM FALLS ESPECIALLY DURING DYNAMIC ACTIVITY/ GAIT.

## 2017-09-05 NOTE — NUR
PT. REQUESTED FOR PAIN MEDS,C/O BACK PAIN MEDICATE FOR PAIN AS ORDERED. MADE
PT. COMFORTABLE IN BED. CALL LIGHT W/ IN REACH RESULTS OF WBC 20.5 DR BACON
NOTIFIED AND MADE AWARE.

## 2017-09-05 NOTE — NUR
AWAKE,ALERT AND ORIENTED,REQUIRES. MODERATE ASSIST. W/ ADL NEEDS. CALL LIGHT
W/ IN REACH.NO ACUTE DISTRESS NOTED.CONT. IV FLUIDS KVO AND IV ANTIBIOTIC AS
ORDERED.VOIDING WELL IN URINAL. P.T. EVAL AND TX.WILL CONT. PLAN OF CARE.

## 2017-09-05 NOTE — NUR
***PT NOTES***
SPOKE W/ CHARGE RN "RUFINO" REGARDING PATIENT. CHARGE RN AWARE THAT PHYSICAL
THERAPY TX WILL BE HELD AT THIS TIME D/T PATIENT HAS COMPRESSION FX T11 & T12
(SEE RADIOLOGY NOTES). ADDITIONALLY, AWAITING CONSULTATION FROM ORTHO DOCTOR &
PER CHARGE RN "RUFINO" PATIENT WILL HAVE MRI. PRIMARY PHYSICAL THERAPIST AWARE.
PVE

## 2017-09-05 NOTE — NUR
EYES CLOSED, BREATHING EVEN AND UNLABORED, EASILY AWAKENED. SLEPT MOSTLY ON
LEFT SIDE THROUGH SHIFT. HAD STATED BACK HURTS WHEN HE LIES FLAT ON BED.

## 2017-09-06 VITALS — SYSTOLIC BLOOD PRESSURE: 108 MMHG | DIASTOLIC BLOOD PRESSURE: 57 MMHG

## 2017-09-06 VITALS — DIASTOLIC BLOOD PRESSURE: 59 MMHG | SYSTOLIC BLOOD PRESSURE: 105 MMHG

## 2017-09-06 VITALS — DIASTOLIC BLOOD PRESSURE: 61 MMHG | SYSTOLIC BLOOD PRESSURE: 118 MMHG

## 2017-09-06 VITALS — SYSTOLIC BLOOD PRESSURE: 118 MMHG | DIASTOLIC BLOOD PRESSURE: 61 MMHG

## 2017-09-06 VITALS — DIASTOLIC BLOOD PRESSURE: 53 MMHG | SYSTOLIC BLOOD PRESSURE: 108 MMHG

## 2017-09-06 VITALS — DIASTOLIC BLOOD PRESSURE: 54 MMHG | SYSTOLIC BLOOD PRESSURE: 98 MMHG

## 2017-09-06 LAB
BASOPHILS NFR BLD: 0.1 % (ref 0–2)
BUN SERPL-MCNC: 11 MG/DL (ref 7–18)
CALCIUM SERPL-MCNC: 8 MG/DL (ref 8.5–10.1)
CHLORIDE SERPL-SCNC: 98 MMOL/L (ref 98–107)
CO2 SERPL-SCNC: 27.2 MMOL/L (ref 21–32)
CREAT SERPL-MCNC: 0.8 MG/DL (ref 0.7–1.3)
ERYTHROCYTE [DISTWIDTH] IN BLOOD BY AUTOMATED COUNT: 16.3 % (ref 11.5–14.5)
GFR SERPLBLD BASED ON 1.73 SQ M-ARVRAT: > 60 ML/MIN
GLUCOSE SERPL-MCNC: 134 MG/DL (ref 74–106)
PLATELET # BLD: 534 X10^3MCL (ref 130–400)
POTASSIUM SERPL-SCNC: 3.3 MMOL/L (ref 3.5–5.1)
SODIUM SERPL-SCNC: 129 MMOL/L (ref 136–145)

## 2017-09-06 NOTE — NUR
EYES CLOSED, BREATHING EVEN AND UNLABORED ON ROOM AIR. SLEPT ON LEFT SIDE MOST
OF NIGHT, STATED TOO MUCH PAIN WHEN HE REPOSITIONS OR SLEEP SUPINE.

## 2017-09-06 NOTE — NUR
PT RESTING COMFORTABLY AT THIS TIME. PT IS AWARE OF MRI RESULTS AND OF PLAN TO
TRANSFER TO HIGHER LEVEL OF CARE AS SOON AS BED IS AVAILABLE. NO FINALIZED
TRANSFER PLAN AT THIS TIME AWAITING ARRANGEMENTS. IVF INFUSING AT TKO. PT
MEDICATED WITH DILAUDID IVP AS ORDERED WHEN REQUESTED FOR SEVERE PAIN AND WAS
STARTED ON MS CONTIN WITH SOME IMPROVEMENT WITH PAIN. WILL CONT TO MONITOR.

## 2017-09-06 NOTE — NUR
***PT NOTES***
SPOKE W/ RN KAREN REGARDING PATIENT. PER RN, HOLD TX AT THIS TIME D/T CRITICAL
FINDINGS OF MRI (SEE RADIOLOGY NOTES FOR DETAILS). WILL FOLLOW UP. PRIMARY
PHYSICAL THERAPIST AWARE.
JOSSELYNE

## 2017-09-06 NOTE — NUR
RECEIVED PT IN BED A/A/OX4 DENIES HA. RESP EVEN AND UNLABORED WITH CLEAR BS
BILAT. DENIES ANY SOB/CP/PRESSURE AT THIS TIME. NOTED WITH TRACE EDEMA TO BLE.
IVF TKO TO LFA. ABD SOFT, NONTENDER WITH ACTIVE BS X4. DENIES ANY N/V AT THIS
TIME. VOIDING FREELY. PT C/O SEVERE BACK PAIN AT 8/10, MEDICATED WITH DILAUDID
IVP AS ORDERED. PT WITH FULL ACTIVE ROM TO ALL EXTREMETIES. CALL LIGHT IN
REACH  NEEDS ATTENDED TO.

## 2017-09-06 NOTE — NUR
PT A/A/O X4. DENIES DIZZINESS AND HEADACHE. BREATH SOUNDS CLEAR. BREATHING
EVEN AND UNLABORED ON ROOM AIR. DENIES CHEST PAIN AND PRESSURE THUS FAR. BOWEL
SOUNDS ACTIVE. NO C/O N/V AND ABD PAIN THUS FAR. NO C/O BACK PAIN THUS FAR.
PITTING EDEMA NOTED ON LEFT FOOT. IV INTACT ON THE LEFT FOREARM INFUSING WITH
NS AT 10 ML/HR. MADE PT COMFORTABLE. PLACED CALL LIGHT WITH IN REACH. WILL
CONTINUE TO MONITOR.

## 2017-09-06 NOTE — NUR
DILAUDID GIVEN FOR C/O SEVERE PAIN AT 9/10 TO BACK, PT MADE AWARE HE WILL NEED
TO GO DOWN TO RADIOLOGY TO REPEAT SOME IMIGES TAKEN FOR MRI THAT WERE
DISTORTED FROM MOVMENT. PT MADE AWARE THAT DILAUDID WILL BE PRE PROCEDURE
MEDICATION TO HELP WITH DISCOMFORT.

## 2017-09-06 NOTE — NUR
DR. SNOWDEN AND DR. KELLER MADE AWARE OF CRITICAL RADIOLOGY REPORT OF MRI
FINDING. PER MD WILL FORWARD TO DR. LAWRENCE.

## 2017-09-07 VITALS — DIASTOLIC BLOOD PRESSURE: 61 MMHG | SYSTOLIC BLOOD PRESSURE: 118 MMHG

## 2017-09-07 VITALS — DIASTOLIC BLOOD PRESSURE: 69 MMHG | SYSTOLIC BLOOD PRESSURE: 108 MMHG

## 2017-09-07 NOTE — NUR
PT C/O PAIN. GAVE PT ROBAXIN AND FLEXERIL PO. PT TOLERATED MEDICATION WELL.
WILL CONTINUE TO MONITOR.

## 2017-09-07 NOTE — NUR
PT RESTING WITH EYES CLOSED. NO DISTRESS AND DISCOMFORT NOTED. ANTONIO AND
ARJUN OF [facility] WAS NOTIFIED OF [facility] BEING DELAYED. WILL CONTINUE TO
MONITOR.

## 2017-09-07 NOTE — NUR
PT WAS TRANSFFERED TO [place] VIA GURNEY ACCOMPANIED BY THE MEDICAL
TRANSPORT. PT WITH IV HEPLOCK ON THE LEFT FOREARM.  PT IS AWAKE AND ALERT WITH
C/O PAIN. ENDORSED TO ARJUN OF [facility] REGARDING PT.

## 2017-11-27 ENCOUNTER — HOSPITAL ENCOUNTER (INPATIENT)
Dept: HOSPITAL 1 - IC | Age: 62
LOS: 11 days | Discharge: NURSING, REHAB OR LONG-TERM CARE | DRG: 871 | End: 2017-12-08
Attending: FAMILY MEDICINE | Admitting: FAMILY MEDICINE
Payer: COMMERCIAL

## 2017-11-27 VITALS — SYSTOLIC BLOOD PRESSURE: 95 MMHG | DIASTOLIC BLOOD PRESSURE: 43 MMHG

## 2017-11-27 VITALS
WEIGHT: 175.27 LBS | BODY MASS INDEX: 27.51 KG/M2 | BODY MASS INDEX: 27.51 KG/M2 | HEIGHT: 67 IN | HEIGHT: 67 IN | WEIGHT: 175.27 LBS

## 2017-11-27 VITALS — SYSTOLIC BLOOD PRESSURE: 92 MMHG | DIASTOLIC BLOOD PRESSURE: 45 MMHG

## 2017-11-27 VITALS — DIASTOLIC BLOOD PRESSURE: 44 MMHG | SYSTOLIC BLOOD PRESSURE: 99 MMHG

## 2017-11-27 DIAGNOSIS — A04.72: ICD-10-CM

## 2017-11-27 DIAGNOSIS — J69.0: ICD-10-CM

## 2017-11-27 DIAGNOSIS — I10: ICD-10-CM

## 2017-11-27 DIAGNOSIS — R65.21: ICD-10-CM

## 2017-11-27 DIAGNOSIS — L89.153: ICD-10-CM

## 2017-11-27 DIAGNOSIS — A41.9: Primary | ICD-10-CM

## 2017-11-27 DIAGNOSIS — D53.9: ICD-10-CM

## 2017-11-27 DIAGNOSIS — N17.0: ICD-10-CM

## 2017-11-27 DIAGNOSIS — E87.6: ICD-10-CM

## 2017-11-27 DIAGNOSIS — I27.20: ICD-10-CM

## 2017-11-27 DIAGNOSIS — G93.41: ICD-10-CM

## 2017-11-27 DIAGNOSIS — Z91.5: ICD-10-CM

## 2017-11-27 DIAGNOSIS — E43: ICD-10-CM

## 2017-11-27 DIAGNOSIS — I51.81: ICD-10-CM

## 2017-11-27 DIAGNOSIS — J96.01: ICD-10-CM

## 2017-11-27 DIAGNOSIS — K76.0: ICD-10-CM

## 2017-11-27 DIAGNOSIS — Z87.891: ICD-10-CM

## 2017-11-27 DIAGNOSIS — E78.1: ICD-10-CM

## 2017-11-27 DIAGNOSIS — I42.9: ICD-10-CM

## 2017-11-27 DIAGNOSIS — B18.2: ICD-10-CM

## 2017-11-27 DIAGNOSIS — M48.54XA: ICD-10-CM

## 2017-11-27 DIAGNOSIS — I50.43: ICD-10-CM

## 2017-11-27 LAB
AMYLASE SERPL-CCNC: 64 U/L (ref 25–115)
CHOLEST SERPL-MCNC: 65 MG/DL (ref ?–200)
CHOLEST/HDLC SERPL: 8.1 MG/DL
HDLC SERPL-MCNC: 8 MG/DL (ref 40–60)
LIPASE SERPL-CCNC: 307 IU/L (ref 73–393)
MAGNESIUM SERPL-MCNC: 2.2 MG/DL (ref 1.8–2.4)
PHOSPHATE SERPL-MCNC: 4.8 MG/DL (ref 2.5–4.9)
T3 SERPL-MCNC: 0.59 NG/ML
T3RU NFR SERPL: 38 % UPTAKE (ref 33–39)
T4 FREE SERPL-MCNC: 1.14 NG/DL (ref 0.76–1.46)
T4 SERPL-MCNC: 4.8 UG/DL (ref 4.7–13.3)
T4/T3 UPTAKE INDEX SERPL: 1.8 UG/DL (ref 1.4–4.5)
TRIGL SERPL-MCNC: 333 MG/DL (ref ?–150)

## 2017-11-27 PROCEDURE — 5A1945Z RESPIRATORY VENTILATION, 24-96 CONSECUTIVE HOURS: ICD-10-PCS | Performed by: INTERNAL MEDICINE

## 2017-11-27 PROCEDURE — C9113 INJ PANTOPRAZOLE SODIUM, VIA: HCPCS

## 2017-11-27 PROCEDURE — A4628 OROPHARYNGEAL SUCTION CATH: HCPCS

## 2017-11-27 PROCEDURE — P9047 ALBUMIN (HUMAN), 25%, 50ML: HCPCS

## 2017-11-27 NOTE — NUR
RECEIVED PT TRANSPORTED FROM [place] VIA GURNEY ACCOMPANIED BY [facility].
RECEIVED REPORT FROM SORAYA YUNG AT [place] AND SORAYA FROM TRANSPORT TEAM.
PT TRANSFERED TO ICU-6 AND CONNECTED TO CARDIAC MONITOR AND SPO2. PT INTUBATED
AND SEDATED ON DIPRIVAN 35 MCG/KG/MIN. PT ON BLOOD PRESSURE SUPPORT WITH
LEVOPHED 0.075 MCG/KG/MIN. PT WEIGHS 79 KG. 7.5 ETT SECURED AT 22 CM AT LIP
LINE AND CONNNECTED TO VENT ON AC/VC MODE, FIO2 50%, Vt 550 AND PEEP 5.
RESPIRATIONS SYMMETRICAL AND UNLABORED. VITAL SIGNS UPON ARRIVAL. BP 92/46
MAP 68, , RR 22, SPO2 99% AND AXILLARY TEMP 98.3. L FEMORAL TLC
PRESENT/SECURE ALL PORTS PATENT. GRIFFITHS CATHETER PRESENT/SECURE DRAINING
CLEAR MICHAEL URINE. FLEXISEAL PRESENT AND DRAINING GREEN LIQUID STOOL. BED
LOCKED AND IN LOWEST POSITON. CALL LIGHT WITHIN REACH WILL CONTINUE TO
MONITOR.

## 2017-11-28 VITALS — SYSTOLIC BLOOD PRESSURE: 98 MMHG | DIASTOLIC BLOOD PRESSURE: 46 MMHG

## 2017-11-28 VITALS — DIASTOLIC BLOOD PRESSURE: 50 MMHG | SYSTOLIC BLOOD PRESSURE: 100 MMHG

## 2017-11-28 VITALS — SYSTOLIC BLOOD PRESSURE: 106 MMHG | DIASTOLIC BLOOD PRESSURE: 54 MMHG

## 2017-11-28 VITALS — DIASTOLIC BLOOD PRESSURE: 45 MMHG | SYSTOLIC BLOOD PRESSURE: 92 MMHG

## 2017-11-28 VITALS — SYSTOLIC BLOOD PRESSURE: 97 MMHG | DIASTOLIC BLOOD PRESSURE: 44 MMHG

## 2017-11-28 VITALS — DIASTOLIC BLOOD PRESSURE: 46 MMHG | SYSTOLIC BLOOD PRESSURE: 96 MMHG

## 2017-11-28 VITALS — DIASTOLIC BLOOD PRESSURE: 47 MMHG | SYSTOLIC BLOOD PRESSURE: 94 MMHG

## 2017-11-28 VITALS — SYSTOLIC BLOOD PRESSURE: 98 MMHG | DIASTOLIC BLOOD PRESSURE: 48 MMHG

## 2017-11-28 VITALS — DIASTOLIC BLOOD PRESSURE: 46 MMHG | SYSTOLIC BLOOD PRESSURE: 94 MMHG

## 2017-11-28 VITALS — DIASTOLIC BLOOD PRESSURE: 48 MMHG | SYSTOLIC BLOOD PRESSURE: 97 MMHG

## 2017-11-28 VITALS — SYSTOLIC BLOOD PRESSURE: 96 MMHG | DIASTOLIC BLOOD PRESSURE: 52 MMHG

## 2017-11-28 VITALS — SYSTOLIC BLOOD PRESSURE: 104 MMHG | DIASTOLIC BLOOD PRESSURE: 50 MMHG

## 2017-11-28 VITALS — DIASTOLIC BLOOD PRESSURE: 48 MMHG | SYSTOLIC BLOOD PRESSURE: 90 MMHG

## 2017-11-28 VITALS — DIASTOLIC BLOOD PRESSURE: 51 MMHG | SYSTOLIC BLOOD PRESSURE: 101 MMHG

## 2017-11-28 VITALS — DIASTOLIC BLOOD PRESSURE: 49 MMHG | SYSTOLIC BLOOD PRESSURE: 95 MMHG

## 2017-11-28 VITALS — SYSTOLIC BLOOD PRESSURE: 100 MMHG | DIASTOLIC BLOOD PRESSURE: 50 MMHG

## 2017-11-28 VITALS — SYSTOLIC BLOOD PRESSURE: 104 MMHG | DIASTOLIC BLOOD PRESSURE: 52 MMHG

## 2017-11-28 VITALS — SYSTOLIC BLOOD PRESSURE: 101 MMHG | DIASTOLIC BLOOD PRESSURE: 51 MMHG

## 2017-11-28 VITALS — DIASTOLIC BLOOD PRESSURE: 44 MMHG | SYSTOLIC BLOOD PRESSURE: 95 MMHG

## 2017-11-28 LAB
AMPHETAMINES UR QL SCN: (no result)
BASOPHILS NFR BLD: 0.3 % (ref 0–2)
BUN SERPL-MCNC: 29 MG/DL (ref 7–18)
CALCIUM SERPL-MCNC: 7.9 MG/DL (ref 8.5–10.1)
CHLORIDE SERPL-SCNC: 100 MMOL/L (ref 98–107)
CO2 SERPL-SCNC: 30.1 MMOL/L (ref 21–32)
CREAT SERPL-MCNC: 1.3 MG/DL (ref 0.7–1.3)
ERYTHROCYTE [DISTWIDTH] IN BLOOD BY AUTOMATED COUNT: 23.4 % (ref 11.5–14.5)
GFR SERPLBLD BASED ON 1.73 SQ M-ARVRAT: 59 ML/MIN
GLUCOSE SERPL-MCNC: 105 MG/DL (ref 74–106)
IRON SERPL-MCNC: 30 UG/DL (ref 65–170)
MICROSCOPIC UR-IMP: YES
PLATELET # BLD: 333 X10^3MCL (ref 130–400)
POTASSIUM SERPL-SCNC: 3 MMOL/L (ref 3.5–5.1)
RBC # BLD AUTO: 3.42 M/MM3 (ref 4.52–5.9)
RBC # UR STRIP.AUTO: (no result) /UL
SODIUM SERPL-SCNC: 139 MMOL/L (ref 136–145)
TIBC SERPL-MCNC: 105 UG/DL (ref 250–450)
UA SPECIFIC GRAVITY: 1.01 (ref 1–1.03)

## 2017-11-28 NOTE — NUR
1. Recommend TF of Nutren Pulmonary at 20ml/hr, increase 10ml q6hr to goal
rate of 55ml/hr. Free water flush:75ml q4hr. This provides 1980kcal, 90g
protein and 1482ml free water daily. This meets 99% caloric needs and 90%
protein needs

## 2017-11-28 NOTE — NUR
RECEIVED BEDSIDE REPORT FROM MIN RN. ALL QUESTIONS AND CONCERNS ADDRESSED.
WILL ASSESS PATIENT SHORTLY.

## 2017-11-28 NOTE — NUR
TRAMSPORTED TO CT VIA BED WITH VENTILATOR IN USE DURING TRANSPORT, AC MODE,
FIO2=30%, CONTINUOUS CARDIAC MONITOR=NSR; CONTINUOUS PULSE OXIMETRY WITH
KN65=425% THROUGHOUT TRANSPOST AND SCAN TIME. [facility] BAG BACKUP. LEFT FEMORAL
CENTRAL LINE WITH MATT-SYNEPHRINE AND DIPRIVAN CONTINUOUSLY VIA PUMPS.
PATIENT'S NURSE MIN AT BEDSIDE FOR REPORT. TRANSFERRED TO CT TABLE WITH
MAXIMAL 4-PERSON ASSIST, VENTILATOR USED DURING SCAN TIME. TRANSFERRED BACK TO
BED WITH MAXIMAL 4-PERSON ASSIST AND TRANSPORTED UNEVENTFULLY BACK TO ICU.

## 2017-11-28 NOTE — NUR
INITIATED NUTRIEN PULM TUBE FEDDING AT 10ML/HR WITH FWF 50ML/Q4HRS, 20ML OF
CLEAR ORANGE RESIDUALS IN STOMACH REPLACED. STARTED CVP MONITORING, CVP 10M.
PT REMAINS SEDATED WITH PROPOFOL AT 25MCG/KG/MIN RSS 4. LEVOPHED AT 4MCG/MIN
INFUSING TO L FEMORAL. SITE WNL. VAP CARE PROVIDED AND SUCIONED GREEN THICK
OUTPUT FROM INLINE SUCTION. PT WAS REPOSITIONED. SOFT RESTAINTS TO BL WRISTS
WITH PASSIVE ROM, SKIN INTACT.

## 2017-11-28 NOTE — NUR
Initial Nutrition Assessment
 
Dx: Acute respiratory failure and septick shock
PMHx: HTN, HLD
PSHx: L Total Knee replacement
Labs: () K:3L, BH, Ca:7.9L, TH, HDL:8L, Trop:0.227/0.199,
BNP:1953H, Lactic acid:3.3H, WBC:19.5H, H/H:11.1/34L
Meds: Colace, Diprivan, Humulin, Lactinex, Levophed, Protonix, NS IV,
Theragran, Zofran, Heparin
Current Nutrition Support: Nutren Pulmonary @ 30ml/hr via NGT. Free water
flush:50ml q4hr.
TF intake: TF started this afternoon
Resiuduals: :20ml
I/O: () 968/1070 (-102ml)
Ht: 67in, 5'7"  Wt: 174#, 79kg   BMI:27.3kg/m2  (overweight)
IBW: 148#,67kg    %IBW: 118%    UBW:unable to obtain
Age:63y/o male
Food Allergies:unable to obtain
Skin:healed stage III decubitis to ulcer, left lower knee skin tear, Stage I
upper elbow pressure ulcer, Stage I left lower elbow pressure ulcer, Stage 1
right elbow pressure ulcer per pt's charts.  Kingsley:11
Edema:+2 pitting edema.
GI: active bowel sounds Last BM: flexiseal in place draining green
liquid stool
Nursing Trigger: N/V/D/C >3days, admitted with potential risk diagnosis and
poor PO intake>3days.
Nutrition Consult: TF initiated, please evaluate.
Pt admitted with acute respiratory failure and septick shock. Pt is intubated
and sedated per H&P.
Spoke to Dr. Mcdaniels on the phone about increasing pt's goal rate to 55ml/hr
and changing free water flush to 75ml q4hr to better meet pts estimated need.
Dr. Mcdaniels agreed with recommendations and input order. During visit, observed
pt intubated/ sedated on Propofol at 25mcg/kg/min=318kcal. Per RN, pt is on TF
of Nutren Pulmonary at 10m/hr with 20ml residuals. Told RN, pt's goal rate is
at 55ml/hr. RN acknowledged.
 
Problem with:  N: No    V:No     D:No    C:No
Recent wt change:unable to obtain   %wt change:N/A
Vitamin/Supplement use:None per H&P
Special diet at home:unable to obtain
Physical activity:unable to obtain
Education: unable to provide due to pt intubated with no family at bedside.
 
 Estimated Nutritional Needs Based on  actual body weight  67kg
 Ventialtor in L/min:6.84      Temperature: 36.4C     MAP: 66
 Energy:1557 vs. 8902-7666 kcal/d  (PSU 2003b for vent support vs.
30-35kcal/kg for sepsis)
 Protein: 100-134g/d    (1.5-2.0g/kg for vent support and sepsis)
 Fluid: 1340-1675ml/d    (20-25ml/kg for elevated BNP) or per doctor
 
Nutrition Diagnosis
1. Inadequate enteral nutrition related to low TF rate as evidenced by current
TF rate only meeting 54% caloric needs and 49% protein needs.
Intervention
1. Recommend TF of Nutren Pulmonary at 20ml/hr, increase 10ml q6hr to goal
rate of 55ml/hr. Free water flush:75ml q4hr. This provides 1980kcal, 90g
protein and 1482ml free water daily. This meets 99% caloric needs and 90%
protein needs.
Monitor/Evaluate
 Goal: TF to meet at least 75% est needs with tolerance
 Monitor: TF intake/tolerance, Labs, GI function
 F/U in 2-3 days as high risk:-

## 2017-11-28 NOTE — NUR
LIVIA RN AND RT ISAURO WITH TRANSFER PERSONAL TO CT OF HEAD VIA BED.
AWAITING FOR PT TO RETURN BACK ONTO FLOOR.

## 2017-11-28 NOTE — NUR
DR. LAWRENCE, RESIDENTS, CHARGE RN AND PRIMARY RN AT BEDSIDE FOR MORNING ROUNDS.
PLAN OF CARE DISCUSSED. WILL CONT TO MONITOR

## 2017-11-29 VITALS — SYSTOLIC BLOOD PRESSURE: 95 MMHG | DIASTOLIC BLOOD PRESSURE: 46 MMHG

## 2017-11-29 VITALS — SYSTOLIC BLOOD PRESSURE: 98 MMHG | DIASTOLIC BLOOD PRESSURE: 45 MMHG

## 2017-11-29 VITALS — SYSTOLIC BLOOD PRESSURE: 97 MMHG | DIASTOLIC BLOOD PRESSURE: 52 MMHG

## 2017-11-29 VITALS — DIASTOLIC BLOOD PRESSURE: 51 MMHG | SYSTOLIC BLOOD PRESSURE: 103 MMHG

## 2017-11-29 VITALS — DIASTOLIC BLOOD PRESSURE: 45 MMHG | SYSTOLIC BLOOD PRESSURE: 95 MMHG

## 2017-11-29 VITALS — DIASTOLIC BLOOD PRESSURE: 51 MMHG | SYSTOLIC BLOOD PRESSURE: 104 MMHG

## 2017-11-29 VITALS — DIASTOLIC BLOOD PRESSURE: 43 MMHG | SYSTOLIC BLOOD PRESSURE: 98 MMHG

## 2017-11-29 VITALS — SYSTOLIC BLOOD PRESSURE: 94 MMHG | DIASTOLIC BLOOD PRESSURE: 42 MMHG

## 2017-11-29 VITALS — DIASTOLIC BLOOD PRESSURE: 47 MMHG | SYSTOLIC BLOOD PRESSURE: 99 MMHG

## 2017-11-29 VITALS — DIASTOLIC BLOOD PRESSURE: 51 MMHG | SYSTOLIC BLOOD PRESSURE: 97 MMHG

## 2017-11-29 VITALS — SYSTOLIC BLOOD PRESSURE: 101 MMHG | DIASTOLIC BLOOD PRESSURE: 51 MMHG

## 2017-11-29 VITALS — SYSTOLIC BLOOD PRESSURE: 99 MMHG | DIASTOLIC BLOOD PRESSURE: 47 MMHG

## 2017-11-29 VITALS — SYSTOLIC BLOOD PRESSURE: 121 MMHG | DIASTOLIC BLOOD PRESSURE: 49 MMHG

## 2017-11-29 VITALS — DIASTOLIC BLOOD PRESSURE: 48 MMHG | SYSTOLIC BLOOD PRESSURE: 98 MMHG

## 2017-11-29 VITALS — SYSTOLIC BLOOD PRESSURE: 101 MMHG | DIASTOLIC BLOOD PRESSURE: 49 MMHG

## 2017-11-29 VITALS — DIASTOLIC BLOOD PRESSURE: 43 MMHG | SYSTOLIC BLOOD PRESSURE: 95 MMHG

## 2017-11-29 LAB
BASOPHILS NFR BLD: 0.3 % (ref 0–2)
BUN SERPL-MCNC: 27 MG/DL (ref 7–18)
CALCIUM SERPL-MCNC: 8.1 MG/DL (ref 8.5–10.1)
CHLORIDE SERPL-SCNC: 105 MMOL/L (ref 98–107)
CO2 SERPL-SCNC: 28.4 MMOL/L (ref 21–32)
CREAT SERPL-MCNC: 1.2 MG/DL (ref 0.7–1.3)
ERYTHROCYTE [DISTWIDTH] IN BLOOD BY AUTOMATED COUNT: 22.9 % (ref 11.5–14.5)
GFR SERPLBLD BASED ON 1.73 SQ M-ARVRAT: > 60 ML/MIN
GLUCOSE SERPL-MCNC: 112 MG/DL (ref 74–106)
PLATELET # BLD: 357 X10^3MCL (ref 130–400)
POTASSIUM SERPL-SCNC: 3.5 MMOL/L (ref 3.5–5.1)
SODIUM SERPL-SCNC: 141 MMOL/L (ref 136–145)

## 2017-11-29 NOTE — NUR
PT SEEN BY  UPDATED ON PTS STATUS.SEDATED WITH PROPOFOL AT 15
MCG/KG/MIN OPENING EYES SPONTANEOUSLY AND TRACKING BUT DOES NOT FOLLOW
COMMANDS.LEVOPHED GTT AT 1 MCG/MIN SBP 97 MEAN 60 MMHG ATTEMPT TO WEAN DOWN AS
TOLERATED.FIO2 DOWN TO 25% MAINTAINING O2 % WITH MINIMAL SECREATIONS
VIA ETT.SCOPE SHOWS SR WITH NO ECTOPIES.TOLERATING FEEDING WILL INCREASE TO
GOAL RATE OF 55 ML/H HOB ELEVATED.BILATERAL SOFT WRIST RESTRAINTS INPLACE FOR
SAFETY PROTOCOL FOLLOWED.

## 2017-11-29 NOTE — NUR
PT IS SEDATED ON PROPOFOL AT 10MCG/KG/MIN RSS 4. PT WITHDRWALS FROM PAINFUL
STIMULIS AND OPENS EYES TO PAINFUL STIMULIS. BL PUPILS EQUAL AND REACTIVE TO
LIGHT 3MM, SLUGGISH. DIMINISH LUNG SOUNDS TO BL LUNG HERRERA. MOD GREEN OUTPUT
FROM IN LINE SUCTION. VAP CARE PROVIDED. SYMMETRICAL CHEST RISE. BS ACTIVE X4
QUADRANTS, NONTENDER PALPATIONS TO ABD. LIQUID DARK GREEN BM, FLEXI SEAL IN
PLACE, NO SIGNS OF LEAKAGE. SOFT RESTRAINTS TO BL WRISTS WITH PASSIVE ROM. PT
ON TURN Q2HR PROTOCOL. MOD PALPATIONS TO BUE AND BLE PULSES, CAP REFILL <3SEC.
SCD TO BLE. Z FLEX BOOTS TO BLE. GRIFFITHS CATH DRAINING WELL BY GRAVITY, DARK
TEA COLOR URINE. BED AT LOW AND CALL LIGHT WITHIN REACH. NS INFUSING AT
30ML/HR AND LEVOPHED AT 3MCG/MIN TO R FEMORAL CENTRAL CATHS. CENTRAL LINE SITE
WNL.

## 2017-11-29 NOTE — NUR
RECEIVED PT RESPONDING TO STIMULATION OPENING EYES TO NAME CALLING AND
GRIMACING BUT DOES NOT FOLLOW COMMANDS.ON PROPOFOL GTT AT 15 MCG/KG/MIN WITH
RSS 4.CARDIAC SCOPE SHOWS SR WITH INVERTED T WAVE HR 92 NO ECTOPIES.INTUBATED
ORALLY  ON FIO2 30%  AC MODE RATE 12 PEEP 5 RR 20 O2 %.LEFT NARES
NGT PATENT WITH NUTREN PULMONARY AT 50 ML/H WITH FWF 50 ML Q 4H HOB ELEVATED
FOR ASPIRATION PRECAUTION RESIDUAL CHECKED WITH NO RESIDUALS.ABDOMEN
SOFT,NORMOACTIVE BOWEL SOUNDS WITH FLEXISEAL RECTAL TIBE INPLACE DRAINING
WATERY BROWN STOOL.SCD IN PLACE.OPTIFOAM TO SACRAL AND BILATERAL ELBOWS
INTACT.REPOSITIONED WITH PILLOW SUPPORT ON BACK WITH Z-FLEX BOOTS INPLACE.ORAL
CARE PER VAP PROTOCOL DONE.

## 2017-11-29 NOTE — NUR
REPOSITOED PT. OPTIFOAM TO SACRAL AND R INNER THIGH HAS BEEN REDRESSED. NO
ACTIVE SIGNS OF BLEEDING TO SACRAL WOUND, NO ORDORS, GRANULATING TISSUES
AROUND WOUND. SACRAL WOUND IS CLEASENED AND DRESSED WITH THERAHONEY. R INNER
THIGH IS CLEANSED AND THERAHONEY APPLIED, DRESSED WITH OPTIFOAM. WILL CONTINUE
TO MONITOR.

## 2017-11-29 NOTE — NUR
WOUND CARE EVALUATION NOTE:
REASON FOR EVALUATION: LOW JOSE JUAN SCORE AND PRESSURE ULCER INJURY SACRALCOCCYX
COMPLETE SKIN ASSESSMENT DONE ON THIS 61 Y/O MALE PT ADMITTED TO [facility] WITH
INITIAL DX OF ALOC. PAST MEDICAL HX INCLUDES HEP. C, HTN, HLD, VERTEBRAL
OSTERMYELITIS. ABOVE INFORMATION OBTAINED FROM ADMISSION H&P. PT IS SEDATED
AND INTUBATED. LABS ARE WBC 15.5, H/H 10.6/32, GLUCOSE 112, ALBUMIN 1.2 PT/INR
11.6/1.1 AND PTT 29.7. MEDICATIONS INCLUDES VANCOMYCIN, FERRIC SODIUM,
ATORVASTATIN AND FOLIC ACID. SKIN IS WARM AND DRY TO TOUCH. RECTAL TUBE IN
PLACE WITH MODERATE AMOUNT DARK BROWN LIQUID STOOL OUTPUT. F/C #16 PATENT WITN
MODERATE AMOUNT MICHAEL COLOR OUTPUT. BLE +1 EDEMA DORSAL PEDAL PULSES PRESENT
AND NORMAL. TOE NAILS SHORT AND THICK WITH CAPILLARY REFILL <3 SEC. INITIAL
NGTF AS ORDER, PLAN OF CARE DISCUSSED WITH PRIMARY RN.
INTEGUMENTARY:
MULTIPLE TATTOS TO UPPER BODY , NECK AND CHEST, BUE AND BLE.
MULTIPLES SCABS TO BUE
LEFT AND RIGHT ELBOWS STAGE I PRESSURE INJURY, LEFT ELBOW 0.5X0.5 CM AND RIGHT
ELBOW 0.3X0.3 CM
SACRALCOCCYX 100 % RESURFACED EPITHEIUM TISSUE PINK IN COLOR 2X3 CM
BILATERAL HEELS BLANCHABLE REDNESS
RECOMMENDATIONS:
APPLY OPTIFORM TO SACRALCOCCYX, LEFT AND RIGHT ELBOWS QD AND PRN IF SOILING
APPLY HYDRAGUARD TO UPPER AND LOWER EXTRIMITIES DRYNESS AREA
KEEP SKIN DRY AND CLEAN AT ALL TIME
TURN AND REPOSITION Q2H, OFFLOAD SACRALCOCCYX
APPLY HEELRAISER FOR BILATERAL HEELS
PRESSURE REDISTUBUTION SURFACE THERAPY
CONTINUE RD RECOMMENDATIOMS
 
ABOVE RECOMMENDATIONS DISCUSSED WITH PRIMARY RN
PLEASE CONTACT WOUND CARE NURSE IF ANY QUESTION OR CHANGE OF SKIN CONDITIONS

## 2017-11-30 VITALS — SYSTOLIC BLOOD PRESSURE: 96 MMHG | DIASTOLIC BLOOD PRESSURE: 44 MMHG

## 2017-11-30 VITALS — DIASTOLIC BLOOD PRESSURE: 44 MMHG | SYSTOLIC BLOOD PRESSURE: 91 MMHG

## 2017-11-30 VITALS — SYSTOLIC BLOOD PRESSURE: 92 MMHG | DIASTOLIC BLOOD PRESSURE: 43 MMHG

## 2017-11-30 VITALS — DIASTOLIC BLOOD PRESSURE: 44 MMHG | SYSTOLIC BLOOD PRESSURE: 99 MMHG

## 2017-11-30 VITALS — SYSTOLIC BLOOD PRESSURE: 87 MMHG | DIASTOLIC BLOOD PRESSURE: 41 MMHG

## 2017-11-30 VITALS — DIASTOLIC BLOOD PRESSURE: 43 MMHG | SYSTOLIC BLOOD PRESSURE: 97 MMHG

## 2017-11-30 VITALS — SYSTOLIC BLOOD PRESSURE: 90 MMHG | DIASTOLIC BLOOD PRESSURE: 39 MMHG

## 2017-11-30 VITALS — SYSTOLIC BLOOD PRESSURE: 95 MMHG | DIASTOLIC BLOOD PRESSURE: 53 MMHG

## 2017-11-30 VITALS — DIASTOLIC BLOOD PRESSURE: 44 MMHG | SYSTOLIC BLOOD PRESSURE: 88 MMHG

## 2017-11-30 VITALS — SYSTOLIC BLOOD PRESSURE: 100 MMHG | DIASTOLIC BLOOD PRESSURE: 42 MMHG

## 2017-11-30 VITALS — SYSTOLIC BLOOD PRESSURE: 93 MMHG | DIASTOLIC BLOOD PRESSURE: 44 MMHG

## 2017-11-30 VITALS — DIASTOLIC BLOOD PRESSURE: 37 MMHG | SYSTOLIC BLOOD PRESSURE: 95 MMHG

## 2017-11-30 VITALS — SYSTOLIC BLOOD PRESSURE: 92 MMHG | DIASTOLIC BLOOD PRESSURE: 47 MMHG

## 2017-11-30 VITALS — SYSTOLIC BLOOD PRESSURE: 87 MMHG | DIASTOLIC BLOOD PRESSURE: 44 MMHG

## 2017-11-30 LAB
BASOPHILS NFR BLD: 0.3 % (ref 0–2)
BUN SERPL-MCNC: 24 MG/DL (ref 7–18)
CALCIUM SERPL-MCNC: 8 MG/DL (ref 8.5–10.1)
CHLORIDE SERPL-SCNC: 109 MMOL/L (ref 98–107)
CO2 SERPL-SCNC: 27.9 MMOL/L (ref 21–32)
CREAT SERPL-MCNC: 1.1 MG/DL (ref 0.7–1.3)
ERYTHROCYTE [DISTWIDTH] IN BLOOD BY AUTOMATED COUNT: 22.4 % (ref 11.5–14.5)
GFR SERPLBLD BASED ON 1.73 SQ M-ARVRAT: > 60 ML/MIN
GLUCOSE SERPL-MCNC: 108 MG/DL (ref 74–106)
MAGNESIUM SERPL-MCNC: 2.4 MG/DL (ref 1.8–2.4)
PHOSPHATE SERPL-MCNC: 3.1 MG/DL (ref 2.5–4.9)
PLATELET # BLD: 354 X10^3MCL (ref 130–400)
POTASSIUM SERPL-SCNC: 3.4 MMOL/L (ref 3.5–5.1)
SODIUM SERPL-SCNC: 144 MMOL/L (ref 136–145)

## 2017-11-30 NOTE — NUR
PT IS NOT ON ANY SEDATION AND IS ABLE TO FOLLOW VERBAL COMMANDS AND GESTURE
APPROPRIATELY TO YES AND NO QUESTIONS. S1S2 PRESENT UPON AUSCULATIONS. NO S/S
OF CHEST PAIN. NG TO L NARES, SECURE TO ETT. ETT TO VENT, SECURE AROUND FACE.
DIMINISH LUNG SOUNDS TO BL LUNG HERRERA. LARGE AMOUNT OF OUTPUT FROM INLINE
SUCTION. VAP CARE PROVIDED. PT ON AC 25%FIO2, RATE 12, LPM 22, , PEEP 5.
SYMMETRICAL CHEST RISE. EDEMA TO BUE AND BLE. SCD TO BLE. SOFT RESTRAINTS TO
BL WRISTS WITH ACTIVE ROM. Z FLEX BOOTS TO BLE. PT ON TURN Q2HR PROTOCOL. PT
ON NUTRIENT PULM 55ML/HR WITH FWF 50ML/Q4HRS NO RESIUDLAS. NONTENDER
PALPATIONS TO ABD. DARK GREEN LIQUID OUTPUT FROM FLEXI SEAL. GRIFFITHS CATH
DRAINING WELL BY GRAVITY, TEA COLOR URINE. PT IS RESTING IN BED WATHCING TV.

## 2017-11-30 NOTE — NUR
RECEIVED PT AO X2 TO PERSON AND PLACE. CARDIAC SOUNDS S1/S2, SINUS RHYTHM 80,
BP 92/43 WITH MAP OF 64. PT IS ON ROOM AIR, NO RESPIRATORY DISTRESS. LUNGS
SOUNDS CLEAR BILATERALY, SATURATING %. NGT PLACED IN LEFT NARE CLAMPED
AT THIS TIME. ABDOMEN IS SOFT WITH ACTIVE BOWEL SOUNDS IN ALL 4 QUADRANTS.
SKIN WARM TO TOUCH. TLC FEMORAL CENTRAL LINE PATENT WITH D5 AND NACL.
GRIFFITHS CATHETER DRAINING, FLEXISEAL DRAINING. 2 POINT SOFT RESTRAINTS
TO BOTH ARMS. EDUCATED FAMILY ON THE IMPORTANCE OF RESTRAINTS AT THIS TIME.
PATIENT IS RESTING [id number], BED AT LOWEST SETTING, CALL LIGHT WITHIN
REACH.

## 2017-11-30 NOTE — NUR
Nutrition Note:
RN called about pt with multiple loose stools and possibly switching formulas
per Dr. Cortez. Per RN, pt with 0ml residuals on Nutren Pulmonary at goal
55ml/hr with free water flush:75ml q4hr. RD recommended adding Banaatrol TID
to help with pt's loose stools. RN to inform doctor about recommendations.
Per bed huddle this morinig, possible extubation today.
RD to follow up with pt tomorrow 12/1 as pt is high risk

## 2017-11-30 NOTE — NUR
PT IS NOT ON ANY SEDATION AND IS ABLE TO FOLLOW VERBAL COMMANDS, WIGGLES TOES.
ABLE TO GESTURE APPROPRIATELY TO YES AND NO QUESTIONS. PT ON CPAP 25%FIO2,
RATE 9, LPM 22, , PEEP 5, PSV 12. DIMINISH LUNG SOUNDS TO BL LUNG
HERRERA. VAP CARE PROVIDED. MOD WHITE OUTPUT FROM INLINE SUCTION. SYMMETRICAL
CHEST RISE. PT ON NUTRIENT PULM 50ML/HR WITH FWF 50ML/ Q4HR, NO RESIDUALS.
FLEXI SEAL DRAINING DARK YELLOW GREEN OUPTUT. GRIFFITHS CATH DRAINING WELL BY
GRAVITY, TEA COLOR URINE. LEVO INFUSING AT 1MCG/MIN AND NS AT 30ML/HR TO
R FEMORAL CENTRAL LINE. CENTRAL LINE SITE WNL.

## 2017-11-30 NOTE — NUR
PT EXTUBATED AT THIS TIME, BY RT. PT PLACED ON O2 VIA NC. PT TOLERATING WELL.
SATING 100%. PT C/O SORE THROAT AT THIS TIME, MADE AWARE THAT THE FEELING IS
D/T IRRITATION FROM THE TUBE. PT ABLE TO NOD UNDERSTANDING. WILL CONTINUE TO
MONITOR.

## 2017-11-30 NOTE — NUR
PT RESTING COMFORTABLY IN BED. PT IS ABLE TO FOLLOW COMMANDS BUT UNCOOPERATIVE
AND ATTEMPITNG TO REACH FOR TUBES AND IV LINES. PT COMPLAINS OF BEING THIRSTY
AND HAS BEEN SWABBED WITH WATER IN MOUTH. INCREASED LEVOPHED TO 1.5 MCG/MIN
SYSTOLIC 85 MEAN 59. REPOSITION TO RIGHT SIDE. C FLEX BOOTS IN PLACE. CALL
LIGHT WITHIN REACH AND BED ON LOW POSITION.

## 2017-11-30 NOTE — NUR
ROSA RT AT BEDSIDE TO EXTUBATED. PT RECIEVED A BREATHING TX POST EXTUBATION.
PT RECIEVING 2L O2 VIA LEANA. SYMMETRICAL CHEST RISE. DAUGHTER AT BEDSIDE. WILL
CONTINUE TO MONITOR.

## 2017-11-30 NOTE — NUR
AM FULL BATH GIVEN,HCG WIPES AND ORAL CARE DONE PER PROTOCOL.PT SEEN BY
 AND  UPDATED ON PTS STATUS AWARE FOR LARGE LIQUID
STOOL=800ML.PT WITH NEGATIVE FLUID BALANCE.REMAINS ON BILATERAL SOFT WRIST
RESTRAINTS ATTEMPTING TO REACH ETT WITH LEFT ARM.

## 2017-11-30 NOTE — NUR
PT SEDATED WITH PROPOFOL 10 MCG/KG/MIN WITH RSS 4.SCOPE SHOWS SR HR 89 .ON
FIO2 25% MAINTAINING O2 SAT 99% RR 21.LEFT TLC CENTRAL LINE INTACT WITH
DRESSING CLEAN AND DRY.LEFT NARES NGT PATENT WITH NUTREN PULMONARY AT 55 ML/H
TOLERATING WELL WITH NO RESIDUALS;HOB ELEVATED.REPOSITIONED Q 2H WITH ZFLEX
BOOTS IN PLACE.LEVOPHED AT .5 MCG/MIN SBP 92 MEAN 65 MMHG;WILL ATTEMPT TO WEAN
OFF AS TOLERATED.

## 2017-12-01 VITALS — DIASTOLIC BLOOD PRESSURE: 44 MMHG | SYSTOLIC BLOOD PRESSURE: 92 MMHG

## 2017-12-01 VITALS — SYSTOLIC BLOOD PRESSURE: 85 MMHG | DIASTOLIC BLOOD PRESSURE: 44 MMHG

## 2017-12-01 VITALS — SYSTOLIC BLOOD PRESSURE: 91 MMHG | DIASTOLIC BLOOD PRESSURE: 49 MMHG

## 2017-12-01 VITALS — SYSTOLIC BLOOD PRESSURE: 92 MMHG | DIASTOLIC BLOOD PRESSURE: 44 MMHG

## 2017-12-01 VITALS — DIASTOLIC BLOOD PRESSURE: 46 MMHG | SYSTOLIC BLOOD PRESSURE: 97 MMHG

## 2017-12-01 VITALS — SYSTOLIC BLOOD PRESSURE: 90 MMHG | DIASTOLIC BLOOD PRESSURE: 45 MMHG

## 2017-12-01 VITALS — SYSTOLIC BLOOD PRESSURE: 93 MMHG | DIASTOLIC BLOOD PRESSURE: 49 MMHG

## 2017-12-01 NOTE — NUR
RECEIVED REPORT FROM CARLINE YUNG AND CHRISTINE YUNG. ALL QUESTIONS AND CONCERNS
ADDRESSED. WILL ASSUME PT CARE.

## 2017-12-01 NOTE — NUR
PAGED GATED DR JIM REGARDING AM POTASSIUM 3.4. AWAITING RESPONSE. WILL
CONTINUE TO MONITOR FOR ANY ACUTE CHANGES.

## 2017-12-01 NOTE — NUR
DR ALVARADO AT BEDSIDE. UPDATES PROVIDED. NO NEW ORDERS AT THIS TIME. WILL
CONTINUE TO MONITOR FOR ANY ACUTE CHANGES.

## 2017-12-01 NOTE — NUR
PT PULLED OUT FLEXI-SEAL. NEW FLEXI-SEAL INSERTED. PT GIVEN BED BATH AND
LINENS CHANGED. VS STABLE. NO S/S OF PAIN OR DISTRESS NOTED. WILL CONTINUE TO
MONITOR FOR ANY ACUTE CHANGES.

## 2017-12-01 NOTE — NUR
DR BRITO AT BEDSIDE. UPDATES PROVIDED. NEW ORDERS RECEIVED. WILL CONTINUE TO
MONITOR FOR ANY ACUTE CHANGES.

## 2017-12-01 NOTE — NUR
PATIENT AWAKE AT THIS TIME AND ABLE TO FOLLOW COMMANDS. PATIENT IS ON SOFT
WRIST RESTRAINTS AS HE IS ATTEMPTING TO PULL OUT IV AND NGT. PATIENT MADE
AWARE. PATIENT ON NASAL CANNULA 2L WITH NO SIGNS OF RESPIRATORY DISTRESS. HEAD
OF BED ELEVATED. CALL LIGHT WITHIN REACH. WILL CONTINUE TO MONITOR.

## 2017-12-01 NOTE — NUR
PATIENT ROUNDS WITH DR. OG AND RESIDENTS. PRIMARY NURSE AND CHARGE NURSE
AT BEDSIDE. UPDATES PROVIDED. POC DISCUSSED. WILL CONTINUE TO MONITOR.

## 2017-12-01 NOTE — NUR
Follow-up Nutrition Assessment
 
Dx:Acute respiratory failure, septick shock and PNA
Labs: () K:3.4L, BH, BUN:24H, Ca:8L, H/H:9.8/30L
Meds: Colace, Ferrlecit, Folic acid, Humulin PRN, KCL, Lactinex, Levophed,
Protonix, NS IV, Theragran, Vitamin B12, Heparin,
Diet: NPO pending swallow eval. Pt was previously on TF of Nutren Pulmonary at
55ml/hr with banana flakes. Free water flush:75ml q4hr with minimal
residuals.
PO intake:N/A due to current NPO status.
Weights: () 174#, () 175#
Skin: Stage 1 presure injury to left and right elbows per wound RN note 
Edema: +2 to BUE
Last BM: hypoactive bowel sounds, Flexiseal in place
Per progress note , patient was extubated 14:27 yesterday.  Nurse reports
that patient has been doing well, repsonding to question. Patient's zaria is
stable as now with 1 mcg/min levophed & 2L nasal cannula.  Patient complains
being thirsty to nurse.
During RD visit, pt was with speech therapist undergoing speech evaluation.
Per RN, pt with no  N/V/C with loose stools secondary to C. Diff.
 
Estimated Nutritional Needs based on current BW:79kg
 Energy: 1975-2370kcal/day  (25-30kcal/kg for maintenance)
 Protein: 63-79g/kg       (0.8-1.0g/kg for maintenance)
 Fluid: 1975-2370ml/day     (25-30kcal/kg for maintenance) or per doctor
 
Nutrition Diagnosis
1. Inadequate enteral nutrition support related to low TF rate as evidenced by
current TF rate only meeting 54% caloric needs and 49% protein needs (N/A due
to pt s/p extubation)
2. Inadequate protein/energy intake related to pt pending swallow evaluation
as evidenced by current NPO status.
Intervention
1. Recommend Regular diet with textures per Speech Therapsit.
Monitor/Evaluate
 Previous goal: TF to meet at least 75% est needs with tolerance (N/A) due to
pt NPO)
 Goal: NPO<5-7days, diet advancement
 Monitor: NPO status, diet advancement,  Labs, GI function
 F/U in  2-3days as high risk: -3

## 2017-12-01 NOTE — NUR
***ST NOTE***
TALKED WITH NURSE AND PT CLEAR FOR SWALLOW EVAL.
PT EVALUATED AND PRESENTED SAFE ON [facility] SOFT CHOPPED DIET/THIN LIQUIDS. NEEDS
SAFE SWALLOW STRATEGIES AS FOLLOWS: SMALL BITES/SIPS, SLOW RATE, ALTERNATE
BITES/SIPS, UPRIGHT AT 90 DEGRESS, 1:1 ASSISTANCE AT ALL TIMES; EDUCATED PT,
FAMILY ANDNURSING REGARDING STRATEGIES AND VERBALIZED UNDERSTANDING. NO ST TX
INDICATED AT THIS TIME.

## 2017-12-02 VITALS — DIASTOLIC BLOOD PRESSURE: 47 MMHG | SYSTOLIC BLOOD PRESSURE: 102 MMHG

## 2017-12-02 VITALS — DIASTOLIC BLOOD PRESSURE: 52 MMHG | SYSTOLIC BLOOD PRESSURE: 98 MMHG

## 2017-12-02 VITALS — SYSTOLIC BLOOD PRESSURE: 101 MMHG | DIASTOLIC BLOOD PRESSURE: 71 MMHG

## 2017-12-02 VITALS — DIASTOLIC BLOOD PRESSURE: 48 MMHG | SYSTOLIC BLOOD PRESSURE: 94 MMHG

## 2017-12-02 VITALS — DIASTOLIC BLOOD PRESSURE: 44 MMHG | SYSTOLIC BLOOD PRESSURE: 96 MMHG

## 2017-12-02 VITALS — DIASTOLIC BLOOD PRESSURE: 54 MMHG | SYSTOLIC BLOOD PRESSURE: 90 MMHG

## 2017-12-02 LAB
BASOPHILS NFR BLD: 0.3 % (ref 0–2)
BUN SERPL-MCNC: 19 MG/DL (ref 7–18)
CALCIUM SERPL-MCNC: 7.5 MG/DL (ref 8.5–10.1)
CHLORIDE SERPL-SCNC: 111 MMOL/L (ref 98–107)
CO2 SERPL-SCNC: 26.2 MMOL/L (ref 21–32)
CREAT SERPL-MCNC: 0.9 MG/DL (ref 0.7–1.3)
ERYTHROCYTE [DISTWIDTH] IN BLOOD BY AUTOMATED COUNT: 21.5 % (ref 11.5–14.5)
GFR SERPLBLD BASED ON 1.73 SQ M-ARVRAT: > 60 ML/MIN
GLUCOSE SERPL-MCNC: 84 MG/DL (ref 74–106)
PLATELET # BLD: 439 X10^3MCL (ref 130–400)
POTASSIUM SERPL-SCNC: 3.6 MMOL/L (ref 3.5–5.1)
RBC MORPH BLD: (no result)
SODIUM SERPL-SCNC: 142 MMOL/L (ref 136–145)

## 2017-12-02 NOTE — NUR
Pt. AAOX3-4 WITH CONFUSION AND FORGETFULNESS. RESPIRATIONS EVEN AND UNLABORED
RA, DENIES PAIN/DISCOMFORT. NO DISTRESS NOTED. TELE IN PLACE. IVF RUNNING TO
CENTRAL LINE Lt. FEMORAL BROWN/BLUE PORTS FLUSHES WELL. WHITE PORT UNABLE TO
FLUSH. GRIFFITHS CATHETER MICHAEL URINE NOTED. DSG CHANGE TO COCCYX OPTIFOAM CDI.
RECTAL TUBE IN PLACE DRAINING DARK WATERY STOOL. REMAINS ON CONTACT
PRECAUTIONS. POOR APPETITE NOTED. BED LOW/LOCKED. CALL LIGHT IN REACH.

## 2017-12-02 NOTE — NUR
RESUMED CARE FOR Pt. FROM ICU, Pt. AAOX4 WITH FORGETFULNESS AND CONFUSION.
RESPIRATIONS EVEN AND UNLABORED RA, DENIES PAIN/DISCOMFORT AT THIS TIME. NO
DISTRESS NOTED. PLACED ON TELE 29 SR WITH DEPRESSED T WAVE, DENIES CHEST
PAIN/PRESSURE. IVF RUNNING TO Lt. FEMORAL CENTRAL LINE BLUE AND BROWN PORTS
FLUSHES WELL, UNABLE TO FLUSH WHITE PORT DSG CDI. GRIFFITHS CATHETER DRAINING
MICHAEL COLORED URINE NOTED. RECTAL TUBE WITH WATERY STOOL NOTED. OPTIFOAM NOTED
BILATERL ELBOWS AND COCCYX CDI. Z FLEX BOOT BLE SCD IN PLACE. AIR MATTRESS IN
PLACE. BED LOW/LOCKED. CALL LIGHT IN REACH. Pt. DAUGHTER AT BEDSIDE.

## 2017-12-02 NOTE — NUR
PATIENT ROUNDS WITH DR. LAWRENCE AND RESIDENTS. CHARGE NURSE AND PRIMARY NURSE AT
BEDSIDE. UPDATES PROVIDED AND POC DISCUSSED. WILL CONTINUE TO MONITOR.

## 2017-12-02 NOTE — NUR
AOX3-4, CONFUSED/FORGETFUL. TELE #29, SR WITH DEVIATED TWAVE. LUNGS CLEAR BUT
DIMINISHED ON RA. PULSES PALPABLE. NO EDEMA. BOWEL SOUNDS ACTIVE. RECTAL TUBE
NOTED. GRIFFITHS PRESENT DRAINING MICHAEL COLOR. OPTIFOAM NOTED TO BILAT ELBOWS.
SCABS TO BILAT KNEES. WOUND CARE DONE ON COCCYX, NEW OPTIFOAM CDI. DENIES PAIN
AT THIS TIME. NS @ 120 ML/HR TO LEFT FEMORAL CENTRAL LINE. BROWN AND BLUE PORT
PATENT. WHITE PORT UNABLE TO FLUSH. LINENS CHANGED, PT CLEAN AND DRY. ON AIR
MATRESS. BED IN LOW POSITION, CALL LIGHT IN REACH. INSTRUCTED TO CALL FOR
ASSISTANCE.

## 2017-12-02 NOTE — NUR
RESTRAINTS TAKEN OFF AT 0730. EDUCATED AND REMINDED PT TO PULL ON LINES AND
TUBES FOR HIS OWN SAFETY. PT VERBALIZED UNDERSTANDING. WILL CONTINUE TO
MONITOR FOR ANY ACUTE CHANGES.

## 2017-12-03 VITALS — DIASTOLIC BLOOD PRESSURE: 52 MMHG | SYSTOLIC BLOOD PRESSURE: 116 MMHG

## 2017-12-03 VITALS — SYSTOLIC BLOOD PRESSURE: 107 MMHG | DIASTOLIC BLOOD PRESSURE: 51 MMHG

## 2017-12-03 VITALS — SYSTOLIC BLOOD PRESSURE: 96 MMHG | DIASTOLIC BLOOD PRESSURE: 50 MMHG

## 2017-12-03 VITALS — DIASTOLIC BLOOD PRESSURE: 50 MMHG | SYSTOLIC BLOOD PRESSURE: 107 MMHG

## 2017-12-03 LAB
BASOPHILS NFR BLD: 0.8 % (ref 0–2)
BUN SERPL-MCNC: 15 MG/DL (ref 7–18)
CALCIUM SERPL-MCNC: 7.4 MG/DL (ref 8.5–10.1)
CHLORIDE SERPL-SCNC: 112 MMOL/L (ref 98–107)
CO2 SERPL-SCNC: 21.9 MMOL/L (ref 21–32)
CREAT SERPL-MCNC: 0.8 MG/DL (ref 0.7–1.3)
ERYTHROCYTE [DISTWIDTH] IN BLOOD BY AUTOMATED COUNT: 20.8 % (ref 11.5–14.5)
GFR SERPLBLD BASED ON 1.73 SQ M-ARVRAT: > 60 ML/MIN
GLUCOSE SERPL-MCNC: 79 MG/DL (ref 74–106)
MAGNESIUM SERPL-MCNC: 2 MG/DL (ref 1.8–2.4)
PHOSPHATE SERPL-MCNC: 2.6 MG/DL (ref 2.5–4.9)
PLATELET # BLD: 478 X10^3MCL (ref 130–400)
POTASSIUM SERPL-SCNC: 3.5 MMOL/L (ref 3.5–5.1)
SODIUM SERPL-SCNC: 144 MMOL/L (ref 136–145)

## 2017-12-03 NOTE — NUR
PT TRIED TO GET OUT OF BED, TRYING TO USE THE RESTROOM. RE-EDUCATED PT THAT HE
HAS A GRIFFITHS CATHETER IN PLACE AND RECTAL TUBE AND THERE IS NO NEED TO GET OUT
OF BED TO USE THE RESTROOM. REPOSITION PT BACK TO BED. SAFETY MEASURES IN
PLACE. BED ALARM ON. WILL CONT TO MONITOR.

## 2017-12-03 NOTE — NUR
WOUND CARE AND MEPILEX APPLIED TO RIGHT AND LEFT ELBOWS. PT RESTING IN BED,
WATCHING TV. CALL LIGHT WITHIN REACH.

## 2017-12-03 NOTE — NUR
PT CO 8/10 BACK PAIN; TREATED WITH NORCO PRN, CRUSHED AND SERVED WITH PUDDING.
PT WATCHING TV. CALL LIGHT WITHIN REACH.

## 2017-12-03 NOTE — NUR
BED ALARM GOING OFF. PT TRYING TO GET OUT OF BED. HELPED PT BACK TO BED.
REPOSITIONED PT. EDUCATED PT TO USE THE CALL LIGHT WHEN NEEDING ASSISTANCE.
SAFETY PRECAUTION IN PLACE. BED ALARM ON. WILL CONT TO MONITOR.

## 2017-12-03 NOTE — NUR
PT REMAINS IN NO DISTRESS, AWAKE AND ALERT. NO CHANGES IN VS. NO C/O PAIN OR
DISCOMFORT. IVF INFUSING WELL AND SITE CLEAR. RECTAL TUBE IN PLACE, GRIFFITHS CATH
DRAINING TO GRAVITY. CALL LIGHT WITHIN REACH. WILL BE ENDORSED TO INCOMING
SHIFT.

## 2017-12-03 NOTE — NUR
*************************NURSING CO-SIGN*************************
THE DOCUMENTATION ENTERED BY THE RN STUDENT NURSE HAS BEEN REVIEWED.
 
REVIEWED/CO-SIGNED BY: Harriett Bearden
DOCUMENTATION DONE BY:BAILEE CARRASCO

## 2017-12-03 NOTE — NUR
PT IS A/O x3. GCS15. PUPILS ARE BRISK. LUNG SOUNDS ARE CLEAR BILATERALLY,
DIMINISHED AT BASES. HEART SOUNDS S1 AND S2 ARE PRESENT, NORMAL SINUS RYTHUM.
NO MURMURS NOTED. PERIPHERAL PULSES ARE PRESENT. CAPILLARY REFILL <3. NO EDEMA
NOTED. PT IS ON MECHANICALLY , CHOPPED DIET.
DOES NOT HAVE AN ACTIVE APPETITE; DID NOT CONSUME BREAKFAST WELL. BOWELL
SOUNDS PRESENT IN ALL 4 QUADRANTS. LAST BM 12/3/17, LOOSE, WATERY STOOL.
RECTAL TUBE IN PLACE. C. DIFF (+), CONTACT ISOLATION PRECAUTIONS. PT HAS GRIFFITHS
CATHETER, ORANGE COLORED URINE, AND IS SOMETIMES CONFUSED ABOUT TOILETING
CIRCUMSTANCE. OPTIFOAM PLACED ON COCCYCX AND RIGHT AND LEFT ELBOWS.
HEEL PRTECTOR IN PLACE. CENTRAL LINE IN LEFT FEMORAL VEIN, NO REDNESS OR
SWELLING NOTED.  AM PO MEDS CRUSHED AND GIVEN WITH APPLESAUCE. EXPERIENCED
8/10 BACK PAIN; TREATED WITH NORCO PRN.

## 2017-12-03 NOTE — NUR
REC'D REPORT FROM DAY NURSE. AAOX3-4. LAYING IN BED WITH THE HOB ELEVATED. NO
S/S OF RESP DISTRESS NOTED. NO TELE, MEDSURG PT. LUNG SOUNDS ARE DIMINISHED
BUT CTA. ON RA. BREATHING EVEN AND UNLABORED. ABD IS ROUND AND ACTIVE. GRIFFITHS
CATHETER IN PLACE AND DRAINING VIA GRAVITY. RECTAL TUBE NOTED AND IN PLACE.
SAFETY MEASURES IN PLACE, BED ALARM ON. CALL LIGHT WITHIN REACH. WILL PROCEED
TO THE PLAN OF CARE.

## 2017-12-03 NOTE — NUR
PT RECIEVED BED BATH. SHEETS CHANGED. PT LAYING IN BED, NO SIGNS OF
DISCOMFORT. CALL LIGHT WITHIN REACH.

## 2017-12-04 VITALS — SYSTOLIC BLOOD PRESSURE: 90 MMHG | DIASTOLIC BLOOD PRESSURE: 54 MMHG

## 2017-12-04 VITALS — DIASTOLIC BLOOD PRESSURE: 57 MMHG | SYSTOLIC BLOOD PRESSURE: 101 MMHG

## 2017-12-04 VITALS — SYSTOLIC BLOOD PRESSURE: 114 MMHG | DIASTOLIC BLOOD PRESSURE: 60 MMHG

## 2017-12-04 VITALS — SYSTOLIC BLOOD PRESSURE: 102 MMHG | DIASTOLIC BLOOD PRESSURE: 55 MMHG

## 2017-12-04 VITALS — SYSTOLIC BLOOD PRESSURE: 107 MMHG | DIASTOLIC BLOOD PRESSURE: 56 MMHG

## 2017-12-04 LAB
BASOPHILS NFR BLD: 0.6 % (ref 0–2)
BUN SERPL-MCNC: 10 MG/DL (ref 7–18)
CALCIUM SERPL-MCNC: 7.7 MG/DL (ref 8.5–10.1)
CHLORIDE SERPL-SCNC: 114 MMOL/L (ref 98–107)
CO2 SERPL-SCNC: 19.8 MMOL/L (ref 21–32)
CREAT SERPL-MCNC: 0.8 MG/DL (ref 0.7–1.3)
ERYTHROCYTE [DISTWIDTH] IN BLOOD BY AUTOMATED COUNT: 21.3 % (ref 11.5–14.5)
GFR SERPLBLD BASED ON 1.73 SQ M-ARVRAT: > 60 ML/MIN
GLUCOSE SERPL-MCNC: 86 MG/DL (ref 74–106)
MAGNESIUM SERPL-MCNC: 1.8 MG/DL (ref 1.8–2.4)
PHOSPHATE SERPL-MCNC: 2.8 MG/DL (ref 2.5–4.9)
PLATELET # BLD: 533 X10^3MCL (ref 130–400)
POTASSIUM SERPL-SCNC: 3.5 MMOL/L (ref 3.5–5.1)
RBC MORPH BLD: (no result)
SODIUM SERPL-SCNC: 141 MMOL/L (ref 136–145)

## 2017-12-04 NOTE — NUR
PT ASSISTED TO BSC, HAD SMALL LOOSE BM X1, DK BROWN. PT CLEANED UP AND SETTLED
BACK INTO BED WITH CALL LIGHT WITHIN REACH. ON LOW AIR LOSS MATTRESS. WILL
CONTINUE TO MONITOR.

## 2017-12-04 NOTE — NUR
1. Recommend continue with mechanical soft chopped with thin liquids.
2. If PO <75%, consider oral nutriton supplement. Boost Plus.

## 2017-12-04 NOTE — NUR
PT IS AWAKE AND ORIENTED X2-3. PT IS FORGETFUL AND CONFUSED AT TIMES, BUT IS
ABLE TO BE REDIRECTED. PT DENIES HA OR DIZZINESS AT THIS TIME. PT MED/SURG
WITH NO TELE. PT DENIES CHEST PAIN OR PRESSURE. PT HAS PALPABLE PULSES BILAT
ALL EXTREMITIES. PT REMAINS ON HEPARIN SQ. NO SIGNS OF EDEMA. PT HAS
DIMINISHED BASES. RESPIRATIONS EVEN AND UNLABORED ON ROOM AIR. PT DENIES SOB.
PT HAS HX OF C-DIFF AND HAS HAD MULTIPLE LOOSE BM'S. PT HAS ACTIVE BOWEL
SOUNDS. PT ABD SOFT, FLAT, AND NONTENDER. PT DENIES ABD PAIN AND N/V AT THI
STIME. PT HAS GRIFFITHS CATH IN PLACE DRAINING TO GRAVITY WITH MICHAEL URINE NOTED.
PT HAS GENERALIZED WEAKNESS AND IS ABLE TO BE TRANSFERRED WITH MIN ASSIST. PT
HAS SCABS TO BILAT ELBOW THAT ARE HEALING. PT HAS SCAB TO LEFT KNEE, HEALING,
CECILIA WITH NO SIGNS OF BLEEDING. BLANCHABLE REDNESS NOTED TO BUTTOCKS WITH
PEELING SKIN NOTED. PT REMAINS ON AIR MATTRESS AT THIS TIME. IV FLUIDS NS
RUNNING AT 80 ML/HR TO RIGHT HAND. IV PATENT WITH NO SIGNS OF INFILTRATION.
PT HAS 1:1 SITTER AT BEDSIDE. PT DENIES PAIN OR DISCOMFORT AT THIS TIME. NO
SIGNS OF DISTRESS. WILL CONTINUE TO MONITOR.

## 2017-12-04 NOTE — NUR
PT SLEPT PERIODICALLY THROUGHOUT THE SHIFT. NO RESP DISTRESS NOTED. ALL NEEDS
MET AND ATTENDED TO. GRIFFITHS CATHETER IN PLACE AND DRAINING. LEFT FEMORAL
CENTRAL LINE PATENT AND CDI. NO SIGNIFICANT CHANGES NOTED. WILL ENDORSE ALL
CARE TO ONCOMING NURSE.

## 2017-12-04 NOTE — NUR
SACRUM OPTIFOAM DRESSING CHANGED. NOTED BLANCHABLE REDNESS WITH PEELING DRY
SKIN. DAUGHTER AT BEDSIDE, ENCOURAGING PATIENT TO EAT MORE AND PARTICIPATE IN
CARE MORE. PT HAD VERY POOR APPETITE THIS PM. DR ALVARADO MADE AWARE WITH
NEW ORDER FOR BOOST TID. WILL CONTINUE TO MONITOR.

## 2017-12-04 NOTE — NUR
PT RECEIVED AAO TO PERSON/PLACE/TIME. REPORTED TO BE FORGETFUL/CONFUSED AT
TIMES. ABLE TO FOLLOW COMMANDS AND MAKE NEEDS KNOWN. NOTED TO MUMBLE AT TIMES.
RESP EVEN AND UNLABORED ON RA. DENIES ANY SOB OR COUGH. DENIES ANY CP OR
PRESSURE. LEFT FEMORAL CENTRAL LINE INTACT. ALL PORTS FLUSHED AND PATENT. IVF
INFUSING WITH NS AT 80ML/HR. ABD ROUND/SOFT WITH ACTIVE BS IN ALL QUADS. NOTED
WITH SMALL LOOSE BM X1 THIS AM. GRIFFITHS CATHETER DRAINING TO GRAVITY WITH DK
YELLOW/MICHAEL URINE. ON LOW AIR LOSS MATTRESS. BILATERAL HEELS OFFLOADED. ON
CONTACT ISOLATION +CDIFF FROM [place].  BED ALARM ON WITH FALL PRECAUTIONS IN
PLACE. CALL LIGHT WITHIN REACH.

## 2017-12-04 NOTE — NUR
PT ASSISTED TO BSC, HAD SMALL LOOSE BM X1. ASSISTED BACK TO BED. FALL
PRECAUTIONS IN PLACE WITH BED ALARM ON.

## 2017-12-04 NOTE — NUR
PT ATTEMPTED TO GET OOB AND UP IN CHAIR, ACCIDENTALLY PULLED OUT LEFT FEMORAL
CENTRAL LINE. PRESSURE DRESSING APPLIED AND SUTURES REMOVED. GRIFFITHS CATHETER
FLUSHED AND PATENT. NO HEMATURIA NOTED AT THIS TIME.  DR MALDONADO JIM AT BEDSIDE TO
EVALUATE.

## 2017-12-04 NOTE — NUR
PT ATTEMPTING TO GET OOB. PT RAMBLING ABOUT NEEDING TO GO DOWNSTAIRS TO SEE
HIS FRIEND WHO FELL OFF OF A SKATEBOARD AND HIT HIS HEAD. PT ORIENTED TO
PERSON PLACE AND TIME. PT UNABLE TO ANSWER FURTHER QUESTIONS ABOUT HIS
"FRIEND." STATES, "AH, NEVERMIND." PT REMINDED NOT TO GET OOB WITHOUT
ASSISTANCE AND TO NOT PULL ON HIS LINES. PT VERBALIZES UNDERSTANDING BUT NEEDS
REINFORCEMENT FOR SAFETY. LEFT FEMORAL CENTRAL LINE PRESSURE DRESSING IN
PLACE. NO SIGNS OF HEMATOMA NOTED.

## 2017-12-04 NOTE — NUR
ATTEMPTED TO BLADDER TRAIN PT AT THIS TIME. INSTRUCTED PT TO NOTIFY
NURSING STAFF IF PT HAS URGE TO URINATE. WILL CONTINUE TO MONITOR.

## 2017-12-04 NOTE — NUR
PT MEDICATED AS ORDERED. GIVEN NORCO X1 AS ORDERED PRN FOR C/O BACK PAIN
UNRELIEVED BY REPOSITIONING. WILL CONTINUE TO MONITOR.

## 2017-12-04 NOTE — NUR
Follow-up Nutrition Assessment
 
Dx:Acute respiratory failure, Septick shock and PNA
Labs:  () B, Ca:7.7L, H/H:9.6/31L
Meds: Ferrlecit, Folic acid, Humlin, KCL, Lactinex, Protonix, NS, Theragran,
Vitamin B12, Zofran, Heparin
Diet: Mechanical soft chopped with thin liquids
PO intake: () D:100%
Weights: () 174#, () 175# () unable to obtain weight due to pt
sitting in chair
Skin: dry scabs to BL elbows, small coccyx wound
Edema: None
Last BM: 12/3 continues with multiple loos unformed BM x1 this AM
Nutrition consult: confusion secondary to low albumin
Per progress note ,nurse reported that patient was agitation, not
following direction.  He pulled out his rectal tube & left femoral central
line. During visit spoke to RN who reports pt has a good appetite with no
N/V/C but continues with loose stools and has periods of confusion. Spoke to
Dr. Palm about nutrition consult and possible low albumin possibly
secondary to BNP: . Per Dr. Palm, they are repeating albumin levels.
Also, we are unaware of pt's baseline prior to admission and pt was already
intubated when he was transferred her from [place]. If pt's low albumin is due
to his nutrition status, then pt would require more protein.
 
 
Estimated Nutritional Needs unchanged from prior assessment:current BW:79kg
 Energy: -237kcal/day  (25-30kcal/kg for maintenance)
 Protein: 63-79g/day        (0.8-1.0g/kg for maintenance)
 Fluid:1975-2370ml/day      (25-30kcal/day for maintenance) or per doctor
 
Nutrition Diagnosis
1. Inadequate enteral nutrition support related to low TF rate as evidenced by
current TF rate only meeting 54% caloric needs and 49% protein needs (N/A due
to pt s/p extubation on PO diet)
2. Inadequate protein/energy intake related to pt pending swallow evaluation
as evidenced by current NPO status (resolved, pt on Mechanical soft chopped
with thin liquids)
Intervention
1. Recommend continue with mechanical soft chopped with thin liquids.
2. If PO <75%, consider oral nutriton supplement. Boost Plus.
Monitor/Evaluate
 Previous goal: NPO<5-7days, diet advancement (met)
 Goal: PO intake at least 75% of estimated needs
 Monitor: PO intake, Labs, GI function
 F/U in  3-5 days as  moderate risk: -

## 2017-12-04 NOTE — NUR
**********PHYSICAL THERAPY DAILY NOTES CO-SIGN**********
All documentation done by the Physical Therapy Assistant for 12/04/17
has been reviewed. I agree with the documentation.
 
Reviewed/Co-Signed by: Gabriela Arcos, ALPHONSO
Documentation Done by: Shayy Moralez, BERNICE
Patient sandra tx well, slowly progressing towards goals, cont with PT POC as
sadnra/safe.

## 2017-12-05 VITALS — SYSTOLIC BLOOD PRESSURE: 107 MMHG | DIASTOLIC BLOOD PRESSURE: 52 MMHG

## 2017-12-05 VITALS — SYSTOLIC BLOOD PRESSURE: 108 MMHG | DIASTOLIC BLOOD PRESSURE: 60 MMHG

## 2017-12-05 VITALS — DIASTOLIC BLOOD PRESSURE: 54 MMHG | SYSTOLIC BLOOD PRESSURE: 99 MMHG

## 2017-12-05 VITALS — DIASTOLIC BLOOD PRESSURE: 64 MMHG | SYSTOLIC BLOOD PRESSURE: 106 MMHG

## 2017-12-05 VITALS — DIASTOLIC BLOOD PRESSURE: 60 MMHG | SYSTOLIC BLOOD PRESSURE: 103 MMHG

## 2017-12-05 LAB
ALBUMIN SERPL-MCNC: 1.7 G/DL (ref 3.4–5)
BASOPHILS NFR BLD: 1.1 % (ref 0–2)
BUN SERPL-MCNC: 6 MG/DL (ref 7–18)
CALCIUM SERPL-MCNC: 7.5 MG/DL (ref 8.5–10.1)
CHLORIDE SERPL-SCNC: 113 MMOL/L (ref 98–107)
CO2 SERPL-SCNC: 19 MMOL/L (ref 21–32)
CREAT SERPL-MCNC: 0.7 MG/DL (ref 0.7–1.3)
DACRYOCYTES BLD QL SMEAR: (no result)
ERYTHROCYTE [DISTWIDTH] IN BLOOD BY AUTOMATED COUNT: 21.2 % (ref 11.5–14.5)
GFR SERPLBLD BASED ON 1.73 SQ M-ARVRAT: > 60 ML/MIN
GLUCOSE SERPL-MCNC: 87 MG/DL (ref 74–106)
PLATELET # BLD: 479 X10^3MCL (ref 130–400)
POTASSIUM SERPL-SCNC: 3.5 MMOL/L (ref 3.5–5.1)
RBC MORPH BLD: (no result)
SODIUM SERPL-SCNC: 142 MMOL/L (ref 136–145)
TARGETS BLD QL SMEAR: (no result)

## 2017-12-05 NOTE — NUR
PT HAD ANOTHER INCONTINENT EPISODE AT THIS TIME. PT CLEANED AND REMAINS
RESTING IN BED AT THIS TIME. 1:1 SITTER REMAINS IN BED. NO SIGNS OF DISTRESS.
WILL CONTINUE TO MONITOR.

## 2017-12-05 NOTE — NUR
PT RECEIVED AAOX4, CONVERSING WELL IN FULL SENTENCES. RESP EVEN AND UNLABORED
ON RA. DENIES ANY SOB OR COUGH. IV ON RHAND INTACT, 22G, INFUSING NS AT
80ML/HR. ABD ROUND/SOFT WITH ACTIVE BS IN ALL QUADS. DENIES ANY ABD PAIN AT
THIS TIME. PT REPORTED TO HAVE 4 LOOSE BM LAST NIGHT. GRIFFITHS CATHETER DRAINING
TO GRAVITY WITH MICHAEL URINE. ON LOW AIR LOSS MATTRESS. ON CONTACT ISOLATION
FOR +CDIFF. FALL PRECAUTIONS IN PLACE. WILL CONTINUE TO MONITOR.

## 2017-12-05 NOTE — NUR
PT IS A/O X3, MILD CONFUSED, BUT ABLE TO TELL WHAT HE NEEDS. LUNG SOUND CLEAR
BILATERAL UPPER CHEST BUT DIM AT SCOOBY BASE, PT DENY ANY RESPIRATORY DISTRESS AT
THIS MOMENT, PO2 97% IN ROOM AIR, DENY ANY CHEST PAIN OR DISCOMFORT, BOWEL
SOUND PRESENT ALL 4 QUADRANTS, NO DISTENTION, PT HAD 4 TIMES LOOSE BM THIS
MORNING, AND PT CONTINUE ON CONTACT ISOLATION FOR C DIFF, PEDAL PULSE PRESENT
BOTH FEET, NO EDEMA, IV AT RIGHT HAND, NO LEAKING, NO INFILTRATION. ALL ADLS
ASSIST, ALL NEED MET, CALL LIGHT IN REACH, WILL CONTINUE TO MONITOR.

## 2017-12-05 NOTE — NUR
PT IS AWAKE AND ORIENTED BUT CONFUSED AT TIMES. PT IS REDIRECTABLE, BUT
RESTLESS AT TIMES. PT HAD MULTIPLE LOOSE BM'S THROUGHOUT THE EVENING AND
CONTACT ISOLATION REMAINS IN PLACE. PT GIVEN PRN PAIN MEDICATIONS X2. PT
UNABLE TO NOTIFY NURSING CARE OF URGE TO URINATE, DR. JIM NOTIFY THIS AM. ALL
PT NEEDS MET. NO SIGNS OF DISTRESS. WILL ENDORSE TO DAY NURSE.

## 2017-12-05 NOTE — NUR
GRIFFITHS CATHETER DC'D AS ORDERD BY NURSING STUDENT UNDER INSTRUCTOR SUPERVISION.
BALLOON INTACT. PT TOLERATED WELL WITHOUT COMPLAINTS. WILL CONTINUE TO
MONITOR.

## 2017-12-05 NOTE — NUR
PT HAD INCONTINENT BM. PT RESISTING AND REFUSING TO BE CHANGED AT THIS TIME.
PT NEEDED LOTS OF ENCOURAGEMENT AND CONVINCING. GOOD PERICARE PROVIDED AND
ZGUARD APPLIED.

## 2017-12-05 NOTE — NUR
PT REFUSING TO TAKE LASIX PO AFTER MULTIPLE ATTEMPTS. REFUSING TO KEEP HOB
ELEVATED. DR JIM MADE AWARE. PT CONTINUES WITH SHALLOW BREATHING. SAT 95% ON
RA. KEPT CLOSELY MONITORED.

## 2017-12-05 NOTE — NUR
PT UPSET, DOES NOT WANT TO TAKE PO MEDS. RAMBLING INCOHERENTLY AT TIMES.
REMAINS ORIENTED X3. DOES NOT WANT HELP USING BED PAN, ATTEMPTS TO USE BEDPAN
ON HIS OWN, AND THEN GETS FRUSTRATED AND THROWS (CLEAN) BEDPAN ACROSS THE
ROOM. ADVISED PT THAT IS NOT APPROPRIATE BEHAVIOR AND PT REPLIES, "YEAH YEAH
YEAH." REMAINS CLOSELY MONITORED. CALL LIGHT WITHIN EASY REACH.

## 2017-12-05 NOTE — NUR
PT REMAINS IN BED AT THIS TIME, PT HAD A BM AT THIS TIME AND HAD AN
INCONTINENT EPISODE. PT SITTER REMAINS AT BED. NO SIGNS OF PAIN OR DISCOMFORT.
NO SIGNS OF DISTRESS. WILL CONITNUE TO MONITOR.

## 2017-12-05 NOTE — NUR
PT MORE AWAKE/ALERT. ORIENTED X3 AND CONVERSING APPROPRIATELY. RESISTIVE TO
TAKING MEDS AND CARE AT TIMES, BUT AGREEABLE WITH LOTS OF ENCOURAGEMENT. KEPT
CLOSELY MONITORED. WILL CONTINUE TO MONITOR.

## 2017-12-05 NOTE — NUR
Recommend adding Boost Plus TID (provides 1080kcal and 42g protein)to help
with PO intake
Recommend Banantrol BID for loose stools/C. Diff

## 2017-12-05 NOTE — NUR
Malik Pickering [id number]
Nutrition Note:
Per bed huddle this morning, pt is on isolation for C. Diff. Pt was given
albumin and level went up from 1.2 to 1.7. Dr. Clinton requested dietary
consult.  During visit, spoke to RN Dory who reports pt's appetite is poor
and she requested Boost  TID last night to help with pt's PO intake  Per RN,
pt does not eat his food but drank 100% of his Boost for breakfast and 50% of
his Boost for lunch. RN will give pt medications with Boost supplement. Pt is
more alert and oriented. RD spoke to Dr. Be about changing Boost to Boost
Plus TID (provides 1080kcal and 42g protein) to provide more kcals and protein
and to add Banantrol BID for pt loose stools/C. Diff. Doctor agreeable with
recommendations and input orders.
RD to follow up with pt in 2-3 days as high risk: 12/7-8

## 2017-12-05 NOTE — NUR
PT URINE CHECKED, BUT PT DID NOT VERBALIZE URGE TO URINATE. VISIBLE SPASMS
NOTICIBLE TO SCROTUM AT THIS TIME. WILL STOP BLADDER TRAINING AT THIS TIME. PT
IS UNABLE TO NOTIFY NURING STAFF OF THE URGE TO URINATE AT THIS TIME. WILL
CONTINUE TO MONITOR.

## 2017-12-06 VITALS — SYSTOLIC BLOOD PRESSURE: 118 MMHG | DIASTOLIC BLOOD PRESSURE: 62 MMHG

## 2017-12-06 VITALS — DIASTOLIC BLOOD PRESSURE: 65 MMHG | SYSTOLIC BLOOD PRESSURE: 127 MMHG

## 2017-12-06 VITALS — DIASTOLIC BLOOD PRESSURE: 67 MMHG | SYSTOLIC BLOOD PRESSURE: 118 MMHG

## 2017-12-06 VITALS — DIASTOLIC BLOOD PRESSURE: 62 MMHG | SYSTOLIC BLOOD PRESSURE: 118 MMHG

## 2017-12-06 NOTE — NUR
PT WHEEZING, C/O SOB. RESPIRATORY THERAPIST AT BEDSIDE FOR BREATHING
TREATMENT. DR. JIM PRESENT AT BEDSIDE ASSESSING PT. WILL CONTINUE TO MONITOR.

## 2017-12-06 NOTE — NUR
IV SITE INFILTRATED, DISCON. NEW IV SITE RESTARTED ON LT ADARSH WITH #22G
ANGIO. WILL CONTINUE TO MONITOR.

## 2017-12-06 NOTE — NUR
PT RESTING IN BED. NO ACUTE DISTRESS. GIVEN NORCO FOR BACK PAIN. BREATHING
EVEN AND UNLABORED ON RA. NO SOB NOTED AT THIS TIME. IV ANTIBIOTICS INFUSING.
BED IN LOW POSITION, CALL LIGHT WITHIN REACH. FALL PRECAUTIONS IN PLACE. WILL
CONTINUE TO MONITOR.

## 2017-12-06 NOTE — NUR
RECEIVED PT IN BED, ALERT. CONFUSED, AGITATED AT TIMES. RESP. EVEN AND
UNLABORED, 02 AT 2L/MIN VIA NC, SAT. 100%. HOB ELEVATED. NO DISTRESS NOTED. NO
TELE. DENIES CHEST PAIN OR PRESSURE. AFEBRILE AND VITAL SIGNS STABLE. HL TO
JOSSELIN, INTACT AND PATENT. ON AIR MATTRESS, SCABS TO R AND L ELBOW AND LT KNEE,
BLANCHABLE REDNESS TO SACRUM. ABLE TO TURN AND REPOSITION SELF. VOIDING
FREELY. CALL LIGHT WITHIN REACH. BED ALARM ON FOR SAFETY. WILL CONTINUE TO
MONITOR.

## 2017-12-06 NOTE — NUR
PT HAD 2 SOFT BM, CLEANED AND MADE COMFORTABLE. RESP EVEN AND UNLABORED ON 2L
NC. NO SOB NOTED. WILL CONTINUE TO MONITOR.

## 2017-12-06 NOTE — NUR
RECEIVED PT IN NO ACUTE DISTRESS. RESTING IN BED WITH EYES CLOSED. APPEARS
COMFORTABLE, NO PAIN NOTED. RESP EVEN AND UNLABORED ON RA. NO SOB NOTED. ON
CONTACT PRECAUTIONS. IVF INFUSING. FALL PRECAUTIONS IN PLACE. BED IN LOW
POSITION, CALL LIGHT WITHIN REACH. WILL CONTINUE TO MONITOR.

## 2017-12-06 NOTE — NUR
PATIENT RESTING IN BED. NO ACUTE DISTRESS. REFUSING MEDICATIONS AND PHYSICAL
THERAPY AT THIS TIME, STATES HE WANTS TO FINISH WATCHING HIS MOVIE. ATTEMPTED
TO EDUCATE PT, PT RESISTIVE TO EDUCATION.

## 2017-12-06 NOTE — NUR
PT IS SLEEPING, AWAKE BY TOUCH, DENY ANY RESPIRATORY DISTRESS, DENY ANY PAIN
OR DISCOMFORT, IV AT RIGHT HAND, NO LEAKING, NO INFILTRATION. ALL ADLS ASSIST,
ALL NEED MET, CALL LIGHT IN REACH, WILL CONTINUE TO MONITOR.

## 2017-12-06 NOTE — NUR
PT RESTING IN BED. NO ACUTE DISTRESS. GIVEN NORCO FOR 9/10 BACK PAIN. CONTACT
PRECAUTIONS IN PLACE. IV TO JOSSELIN FLUSHED AND PATENT. FALL PRECAUTIONS IN PLACE.
BED IN LOW POSITION, CALL LIGHT WITHIN REACH. WILL ENDORSE TO INCOMING SHIFT.

## 2017-12-06 NOTE — NUR
****P.T. NOTES****
12/6/17 XR CHEST:PENDING
Pt WAS SEEN MULTIPLE TIMES TODAY, ATTEMPTED FOR OOB W/ P.T., NURSE SITTER
IN ROOM, CONTACT ISOL PREC OBSERVED FOR C DIFF; NO DIARRHEA PER NURSE;
SPEAKS ENGLISH, ORIENTED x3, EARLIER DECLINED W/ P.T. DUE TO "I'M WATCHING
MY SHOW! I DON'T WANT TO DO IT NOW!"; RETURNED LATER, Pt MEDICATED, UNABLE
TO ACTIVELY PARTICIPATE W/ P.T. STAFF; RETURNED TO ROOM, DAUGHTER (OSMAN)
IN ROOM & ENCOURAGED Pt TO PARTICIPATE W/ P.T., Pt STILL REFUSED, EXPLAINED
BENEFITS OF THERAPY, Pt STILL DECLINED, GETS EASILY AGITATED, IGNORING P.T.
STAFF AT THIS TIME; NURSE SITTER IN ROOM, BSC IN REACH; CALL GARZA, PHONE,
TABLE IN REACH; ON ROOM AIR; DAUGHTER IN ROOM; FOLLOW UP TOMORROW; H/O
HEPA-C, T/S COMP FX.
PVE

## 2017-12-07 VITALS — SYSTOLIC BLOOD PRESSURE: 118 MMHG | DIASTOLIC BLOOD PRESSURE: 62 MMHG

## 2017-12-07 VITALS — SYSTOLIC BLOOD PRESSURE: 117 MMHG | DIASTOLIC BLOOD PRESSURE: 65 MMHG

## 2017-12-07 NOTE — NUR
PT RESTING IN BED. NO ACUTE DISTRESS. APPEARS COMFORTABLE, NO PAIN NOTED.
BREATHING EVEN AND UNLABORED ON BIPAP. NO SOB NOTED. FALL PRECAUTIONS IN
PLACE. BED IN LOW POSITION, CALL LIGHT WITHIN REACH. BED ALARM ON.WILL
CONTINUE TO MONITOR.

## 2017-12-07 NOTE — NUR
INCONT. OF URINE, CLEANED AND KEPT COMFORTABLE. DOOZING OFF AND ON AT THIS
TIME. WILL CONTINUE TO MONITOR.

## 2017-12-07 NOTE — NUR
COMPLAINED OF BACK PAIN, UNABLE TO STATE PAIN LEVEL, MEDICATED WITH NORCO PO
AS ORDERED. WILL CONTINUE TO MONITOR.

## 2017-12-07 NOTE — NUR
PT SUDDENLY C/O SOB, STATES HE CANNOT BREATHE. ON 2L NC. CHECKED O2 SAT 97%.
PLACED ON 5L SIMPLE MASK, O2 %. RESPIRATORY THERAPIST CALLED AND NOW
AT BEDSIDE GIVING BREATHING TREATMENT. WILL CONTINUE TO MONITOR.

## 2017-12-07 NOTE — NUR
RESTLESS, AGITATED, UNABLE TO SLEEP, MEDICATED WITH HALDOL 2.5MG IM AS
ORDERED. WILL CONTINUE TO MONITOR.

## 2017-12-07 NOTE — NUR
PT GETTING RESTLESS, TAKING OFF NC AND STATING HE CANNOT BREATHE. O2 SAT
CHECKED = 98%. RT CALLED AND PLACED PT BACK ON BIPAP, O2 %. RESP EVEN
AND UNLABORED. DR. JIM AWARE. WILL CONTINUE TO MONITOR.

## 2017-12-07 NOTE — NUR
**********PHYSICAL THERAPY DAILY NOTES CO-SIGN**********
All documentation done by the Physical Therapy Assistant for 12/07/17
has been reviewed. I agree with the documentation.
 
Reviewed/Co-Signed by: Polina Moran PT
Documentation Done by:ALBERTO HOWELL PTA
 
POC REVIEWED W/ PTA; PROGRESS AS NAGA; Pt DEMO SELF-LIMITING BEHAVIOR,
PSYCHOSOCIAL ISSUE; REFER TO PULMO CONSULT 12/7/17; ASSESS O2 SAT
W/ ACTIVITY.

## 2017-12-07 NOTE — NUR
DUE MEDS GIVEN AS ORDERED, NAGA. WELL. NO COMPLAINTS NOTED AT THIS TIME.
CONTACT ISOALTION PREC. MAINTAINED. WILL ENDORSE TO INCOMING NURSE.

## 2017-12-07 NOTE — NUR
RECEIVED PT IN BED, ALERT , CONFUSED AND DISORIENTED. ABLE TO FOLLOW SOME
COMMANDS. RESP. EVEN AND UNLABORED. 02 IN PLACE, HOB ELELVATED. NO DISTRESS
NOTED. AFEBRILE AND VITAL SIGNS STABLE. NO TELE, DENIES CP OR ANY DISCOMFORT
AT THIS TIME. HL TO LFA, INTACT AND PATENT. ABD. SOFT, NON DISTENDED, NO N/V
NOTED. REMAINS ON CONTACT ISOLATION, WILL MAINTAIN PRECAUTIONS. CALL LIGHT
WITHIN REACH. WILL CONT. TO MONITOR.

## 2017-12-07 NOTE — NUR
RECEIVED PT IN NO ACUTE DISTRESS. RESP EVEN AND UNLABORED ON BIPAP. APPEARS
COMFORTABLE, RESTING WITH EYES CLOSED. NO PAIN NOTED. BED IN LOW POSITION,
CALL LIGHT WITHIN REACH. FALL PRECAUTIONS IN PLACE. WILL CONTINUE TO MONITOR.

## 2017-12-07 NOTE — NUR
PT PLACED ON 3L NC BY RT. NO ACUTE RESP DISTRESS AT THIS TIME. O2 SAT 97%. NO
SOB NOTED. WILL CONTINUE TO MONITOR.

## 2017-12-07 NOTE — NUR
PT RESTING IN BED. NO ACUTE DISTRESS. RESP EVEN AND UNLABORED ON 3L NC. NO SOB
NOTED. GIVEN NORCO FOR BACK PAIN. SIDERAILS UP X3. BED IN LOW POSITION, CALL
LIGHT WITHIN REACH. BED ALARM ON. WILL ENDORSE TO INCOMING SHIFT.

## 2017-12-07 NOTE — NUR
PATIENT PLACED ON 3L NC BY RT. NO ACUTE RESP DISTRESS AT THIS TIME. PHYSICAL
THERAPY ASSISTED PATIENT TO RECLINER CHAIR FOR LUNCH. TOLERATING WELL. WILL
CONTINUE TO MONITOR.

## 2017-12-07 NOTE — NUR
CONFUSED, TRYING TO GET OUT OF BED. KEEPS REMOVING NASAL CANULA, DESATING ON
ROOM AIR, SAT.78-80% . RESP. TREATMENT GIVEN BY RT. PLACED ON BIPAP MACHINE
FOR RESP. SUPPORT. HOB ELEVATED. INCONT. OF URINE, KEPT CLEAN AND DRY. NO BM
DURING THE NIGHT. WILL CONTINUE TO MONITOR.

## 2017-12-08 VITALS — SYSTOLIC BLOOD PRESSURE: 125 MMHG | DIASTOLIC BLOOD PRESSURE: 52 MMHG

## 2017-12-08 VITALS — SYSTOLIC BLOOD PRESSURE: 121 MMHG | DIASTOLIC BLOOD PRESSURE: 70 MMHG

## 2017-12-08 VITALS — DIASTOLIC BLOOD PRESSURE: 70 MMHG | SYSTOLIC BLOOD PRESSURE: 121 MMHG

## 2017-12-08 NOTE — NUR
PATIENT TO BE TRANSFERRED TO [facility] ROOM 309NESS RN RECEIVED
REPORT, REQUESTING FOR IV TO REMAIN, ALL QUESTIONS ANSWERED.

## 2017-12-08 NOTE — NUR
ABLE TO TAKE ALL PO MEDS WITHOUT GI DISTRESS, PT WILL BE WORKING WITH PHYSICAL
THERAPY AT THIS TIME.

## 2017-12-08 NOTE — NUR
DAUGHTER AT BEDSIDE, REQUESTING TO FILE REPORT OF PATIENT'S MISSING WALLET,
DAUGHTER STATES SHE BROUGHT IN WALLET TO PATIENT WHILE HE WAS IN ICU. PER
GUNNER YUNG, THERE ARE CURRENTLY NO BELONGINS FOR THIS PATIENT IN ICU, SECURITY
MADE AWARE AND WILL BE COMING UP TO SPEAK WITH DAUGHTER AND PATIENT.
CURRENTLY, ALL OF PATIENT'S BELONGINS HAVE BEEN PLACED IN BAGS, INCLUDING AN
ELECTRIC SHAVER AND A GIFT BAG WITH UNKNOWN CONTENTS, PATIENT'S PHONE IS WITH
THE PATIENT, DAUGHTER ASKED TO PLEASE CHECK NIGHT STAND AND ALL DRAWERS FOR
ANY POSSIBLE BELONGINS LEFT BEHIND.

## 2017-12-08 NOTE — NUR
NO COMPLAINTS NOTED AT THIS TIME. DUE MEDS GIVEN AS ORDERED, NAGA. WELL. NO BM
NOTED. NO COMPLAINTS OF PAIN OR ANY DISCOMFORT AT THIS TIME. INCONT. AT TIMES,
CLEANED , BED SHEETS CHANGED  AND KEPT COMFORTABLE. CONTACT ISOLATION PREC.
MAINTAINED. WILL ENDORSE TO INCOMING NURSE.

## 2017-12-08 NOTE — NUR
**********PHYSICAL THERAPY DAILY NOTES CO-SIGN**********
All documentation done by the Physical Therapy Assistant for 12/08/17
has been reviewed. I agree with the documentation.
 
Reviewed/Co-Signed by: Polina Moran PT
Documentation Done by:ALBERTO HOWELL PTA
 
POC REVIEWED W/ PTA; WILL BENEFIT W/ P.T. AFTER ACUTE STAY.

## 2017-12-08 NOTE — NUR
AWAKE AND ALERT, ABLE TO MAKE NEEDS KNOWN, TRYING TO GET OUT OF BED,
REORIENTED TO CURRENT STATUS, ROOM AND CALL LIGHT, LABORED BREATHING NOTED, RT
CALLED FOR TX, IS ON OXYGEN VIA NC AT 3L, CONGESTED UPPER LOBES, SYM CHEST
EXPANSION, ASSISTED TO POSITION OF COMFORT, BED ALARM ON, AIR MATTRESS IN
PLACE, NO LOOSE STOOLS NOTED AT THIS TIME, CALL LIGHT WITHIN REACH, WILL
CONTINUE TO PROVIDE CARE.

## 2017-12-30 ENCOUNTER — HOSPITAL ENCOUNTER (EMERGENCY)
Dept: HOSPITAL 1 - ED | Age: 62
Discharge: LEFT BEFORE DISCHARGE | End: 2017-12-30
Payer: COMMERCIAL

## 2017-12-30 VITALS — WEIGHT: 163.98 LBS | HEIGHT: 69 IN | BODY MASS INDEX: 24.29 KG/M2

## 2017-12-30 VITALS — SYSTOLIC BLOOD PRESSURE: 145 MMHG | DIASTOLIC BLOOD PRESSURE: 77 MMHG

## 2017-12-30 DIAGNOSIS — Z53.21: Primary | ICD-10-CM

## 2017-12-31 ENCOUNTER — HOSPITAL ENCOUNTER (EMERGENCY)
Dept: HOSPITAL 1 - ED | Age: 62
Discharge: HOME | End: 2017-12-31
Payer: COMMERCIAL

## 2017-12-31 VITALS — DIASTOLIC BLOOD PRESSURE: 75 MMHG | SYSTOLIC BLOOD PRESSURE: 129 MMHG

## 2017-12-31 VITALS
WEIGHT: 162.99 LBS | HEIGHT: 69 IN | HEIGHT: 69 IN | WEIGHT: 162.99 LBS | BODY MASS INDEX: 24.14 KG/M2 | BODY MASS INDEX: 24.14 KG/M2

## 2017-12-31 DIAGNOSIS — R09.1: ICD-10-CM

## 2017-12-31 DIAGNOSIS — R07.89: Primary | ICD-10-CM

## 2017-12-31 DIAGNOSIS — K29.70: ICD-10-CM

## 2017-12-31 DIAGNOSIS — K21.9: ICD-10-CM

## 2017-12-31 DIAGNOSIS — I10: ICD-10-CM

## 2017-12-31 LAB
ALBUMIN SERPL-MCNC: 2.9 G/DL (ref 3.4–5)
ALP SERPL-CCNC: 97 U/L (ref 46–116)
ALT SERPL-CCNC: 31 U/L (ref 16–63)
AST SERPL-CCNC: 37 U/L (ref 15–37)
BASOPHILS NFR BLD: 0.5 % (ref 0–2)
BILIRUB SERPL-MCNC: 0.5 MG/DL (ref 0.2–1)
BUN SERPL-MCNC: 15 MG/DL (ref 7–18)
CALCIUM SERPL-MCNC: 8.8 MG/DL (ref 8.5–10.1)
CHLORIDE SERPL-SCNC: 105 MMOL/L (ref 98–107)
CO2 SERPL-SCNC: 22.5 MMOL/L (ref 21–32)
CREAT SERPL-MCNC: 0.9 MG/DL (ref 0.7–1.3)
ERYTHROCYTE [DISTWIDTH] IN BLOOD BY AUTOMATED COUNT: 16.9 % (ref 11.5–14.5)
GFR SERPLBLD BASED ON 1.73 SQ M-ARVRAT: > 60 ML/MIN
GLUCOSE SERPL-MCNC: 94 MG/DL (ref 74–106)
PLATELET # BLD: 239 X10^3MCL (ref 130–400)
POTASSIUM SERPL-SCNC: 4.4 MMOL/L (ref 3.5–5.1)
PROT SERPL-MCNC: 6.9 G/DL (ref 6.4–8.2)
SODIUM SERPL-SCNC: 139 MMOL/L (ref 136–145)

## 2018-01-01 ENCOUNTER — HOSPITAL ENCOUNTER (EMERGENCY)
Dept: HOSPITAL 1 - ED | Age: 63
Discharge: LEFT BEFORE DISCHARGE | End: 2018-01-01
Payer: COMMERCIAL

## 2018-01-01 ENCOUNTER — HOSPITAL ENCOUNTER (OUTPATIENT)
Dept: HOSPITAL 1 - ED | Age: 63
Setting detail: OBSERVATION
LOS: 1 days | Discharge: HOME | DRG: 291 | End: 2018-01-02
Attending: STUDENT IN AN ORGANIZED HEALTH CARE EDUCATION/TRAINING PROGRAM | Admitting: STUDENT IN AN ORGANIZED HEALTH CARE EDUCATION/TRAINING PROGRAM
Payer: COMMERCIAL

## 2018-01-01 VITALS
BODY MASS INDEX: 24.14 KG/M2 | HEIGHT: 69 IN | HEIGHT: 69 IN | WEIGHT: 162.99 LBS | BODY MASS INDEX: 24.14 KG/M2 | WEIGHT: 162.99 LBS

## 2018-01-01 VITALS
WEIGHT: 163.36 LBS | BODY MASS INDEX: 25.64 KG/M2 | WEIGHT: 163.36 LBS | HEIGHT: 67 IN | HEIGHT: 67 IN | BODY MASS INDEX: 25.64 KG/M2

## 2018-01-01 VITALS — SYSTOLIC BLOOD PRESSURE: 117 MMHG | DIASTOLIC BLOOD PRESSURE: 67 MMHG

## 2018-01-01 DIAGNOSIS — I11.0: ICD-10-CM

## 2018-01-01 DIAGNOSIS — E78.5: ICD-10-CM

## 2018-01-01 DIAGNOSIS — F10.21: ICD-10-CM

## 2018-01-01 DIAGNOSIS — I33.0: ICD-10-CM

## 2018-01-01 DIAGNOSIS — K21.9: ICD-10-CM

## 2018-01-01 DIAGNOSIS — E44.0: ICD-10-CM

## 2018-01-01 DIAGNOSIS — R07.89: Primary | ICD-10-CM

## 2018-01-01 DIAGNOSIS — R21: ICD-10-CM

## 2018-01-01 DIAGNOSIS — Z96.652: ICD-10-CM

## 2018-01-01 DIAGNOSIS — D53.9: ICD-10-CM

## 2018-01-01 DIAGNOSIS — I51.81: ICD-10-CM

## 2018-01-01 DIAGNOSIS — I50.9: ICD-10-CM

## 2018-01-01 DIAGNOSIS — I35.1: ICD-10-CM

## 2018-01-01 DIAGNOSIS — I50.43: Primary | ICD-10-CM

## 2018-01-01 DIAGNOSIS — K70.30: ICD-10-CM

## 2018-01-01 DIAGNOSIS — Z91.14: ICD-10-CM

## 2018-01-01 DIAGNOSIS — Z91.19: ICD-10-CM

## 2018-01-01 DIAGNOSIS — H66.90: ICD-10-CM

## 2018-01-01 DIAGNOSIS — N17.0: ICD-10-CM

## 2018-01-01 LAB
ALBUMIN SERPL-MCNC: 3 G/DL (ref 3.4–5)
ALP SERPL-CCNC: 107 U/L (ref 46–116)
ALT SERPL-CCNC: 40 U/L (ref 16–63)
AST SERPL-CCNC: 53 U/L (ref 15–37)
BASOPHILS NFR BLD: 0.4 % (ref 0–2)
BILIRUB SERPL-MCNC: 0.5 MG/DL (ref 0.2–1)
BUN SERPL-MCNC: 17 MG/DL (ref 7–18)
CALCIUM SERPL-MCNC: 9 MG/DL (ref 8.5–10.1)
CHLORIDE SERPL-SCNC: 107 MMOL/L (ref 98–107)
CHOLEST SERPL-MCNC: 155 MG/DL (ref ?–200)
CHOLEST/HDLC SERPL: 3.4 MG/DL
CO2 SERPL-SCNC: 18.6 MMOL/L (ref 21–32)
CREAT SERPL-MCNC: 1 MG/DL (ref 0.7–1.3)
ERYTHROCYTE [DISTWIDTH] IN BLOOD BY AUTOMATED COUNT: 16.6 % (ref 11.5–14.5)
GFR SERPLBLD BASED ON 1.73 SQ M-ARVRAT: > 60 ML/MIN
GLUCOSE SERPL-MCNC: 94 MG/DL (ref 74–106)
HDLC SERPL-MCNC: 45 MG/DL (ref 40–60)
LIPASE SERPL-CCNC: 124 IU/L (ref 73–393)
MICROSCOPIC UR-IMP: YES
PLATELET # BLD: 254 X10^3MCL (ref 130–400)
POTASSIUM SERPL-SCNC: 4.2 MMOL/L (ref 3.5–5.1)
PROT SERPL-MCNC: 7.2 G/DL (ref 6.4–8.2)
RBC # UR STRIP.AUTO: NEGATIVE /UL
SODIUM SERPL-SCNC: 140 MMOL/L (ref 136–145)
T3 SERPL-MCNC: 0.94 NG/ML
T3RU NFR SERPL: 34 % UPTAKE (ref 33–39)
T4 FREE SERPL-MCNC: 1.46 NG/DL (ref 0.76–1.46)
T4 SERPL-MCNC: 8.5 UG/DL (ref 4.7–13.3)
T4/T3 UPTAKE INDEX SERPL: 2.9 UG/DL (ref 1.4–4.5)
TRIGL SERPL-MCNC: 121 MG/DL (ref ?–150)
UA SPECIFIC GRAVITY: >=1.03 (ref 1–1.03)

## 2018-01-01 PROCEDURE — G0378 HOSPITAL OBSERVATION PER HR: HCPCS

## 2018-01-02 VITALS — DIASTOLIC BLOOD PRESSURE: 62 MMHG | SYSTOLIC BLOOD PRESSURE: 105 MMHG

## 2018-01-02 VITALS — DIASTOLIC BLOOD PRESSURE: 72 MMHG | SYSTOLIC BLOOD PRESSURE: 112 MMHG

## 2018-01-02 VITALS — DIASTOLIC BLOOD PRESSURE: 61 MMHG | SYSTOLIC BLOOD PRESSURE: 115 MMHG

## 2018-01-02 VITALS — SYSTOLIC BLOOD PRESSURE: 112 MMHG | DIASTOLIC BLOOD PRESSURE: 72 MMHG

## 2018-01-02 VITALS — SYSTOLIC BLOOD PRESSURE: 115 MMHG | DIASTOLIC BLOOD PRESSURE: 61 MMHG

## 2018-01-02 LAB
ALBUMIN SERPL-MCNC: 2.9 G/DL (ref 3.4–5)
ALP SERPL-CCNC: 156 U/L (ref 46–116)
ALT SERPL-CCNC: 56 U/L (ref 16–63)
AMYLASE SERPL-CCNC: 40 U/L (ref 25–115)
AST SERPL-CCNC: 88 U/L (ref 15–37)
BASOPHILS NFR BLD: 0.6 % (ref 0–2)
BILIRUB SERPL-MCNC: 0.53 MG/DL (ref 0.2–1)
BUN SERPL-MCNC: 23 MG/DL (ref 7–18)
CALCIUM SERPL-MCNC: 8.6 MG/DL (ref 8.5–10.1)
CHLORIDE SERPL-SCNC: 109 MMOL/L (ref 98–107)
CHOLEST SERPL-MCNC: 143 MG/DL (ref ?–200)
CHOLEST/HDLC SERPL: 3.4 MG/DL
CO2 SERPL-SCNC: 17.4 MMOL/L (ref 21–32)
CREAT SERPL-MCNC: 1.1 MG/DL (ref 0.7–1.3)
ERYTHROCYTE [DISTWIDTH] IN BLOOD BY AUTOMATED COUNT: 17.2 % (ref 11.5–14.5)
GFR SERPLBLD BASED ON 1.73 SQ M-ARVRAT: > 60 ML/MIN
GLUCOSE SERPL-MCNC: 110 MG/DL (ref 74–106)
HDLC SERPL-MCNC: 42 MG/DL (ref 40–60)
LIPASE SERPL-CCNC: 127 IU/L (ref 73–393)
MAGNESIUM SERPL-MCNC: 1.9 MG/DL (ref 1.8–2.4)
PHOSPHATE SERPL-MCNC: 4.6 MG/DL (ref 2.5–4.9)
PLATELET # BLD: 247 X10^3MCL (ref 130–400)
POTASSIUM SERPL-SCNC: 4.4 MMOL/L (ref 3.5–5.1)
PROT SERPL-MCNC: 6.8 G/DL (ref 6.4–8.2)
SODIUM SERPL-SCNC: 140 MMOL/L (ref 136–145)
T3 SERPL-MCNC: 0.88 NG/ML
T3RU NFR SERPL: 38 % UPTAKE (ref 33–39)
T4 FREE SERPL-MCNC: 1.6 NG/DL (ref 0.76–1.46)
T4 SERPL-MCNC: 8.5 UG/DL (ref 4.7–13.3)
T4/T3 UPTAKE INDEX SERPL: 3.2 UG/DL (ref 1.4–4.5)
TRIGL SERPL-MCNC: 110 MG/DL (ref ?–150)

## 2018-01-05 ENCOUNTER — HOSPITAL ENCOUNTER (EMERGENCY)
Dept: HOSPITAL 1 - ED | Age: 63
LOS: 1 days | End: 2018-01-06
Payer: COMMERCIAL

## 2018-01-05 VITALS
WEIGHT: 162.99 LBS | BODY MASS INDEX: 23.33 KG/M2 | BODY MASS INDEX: 23.33 KG/M2 | WEIGHT: 162.99 LBS | HEIGHT: 70 IN | HEIGHT: 70 IN

## 2018-01-05 DIAGNOSIS — E87.2: ICD-10-CM

## 2018-01-05 DIAGNOSIS — I46.9: Primary | ICD-10-CM

## 2018-01-05 DIAGNOSIS — I10: ICD-10-CM

## 2018-01-05 DIAGNOSIS — E87.5: ICD-10-CM

## 2018-01-05 DIAGNOSIS — K21.9: ICD-10-CM

## 2018-01-05 PROCEDURE — G0480 DRUG TEST DEF 1-7 CLASSES: HCPCS

## 2018-01-06 VITALS — DIASTOLIC BLOOD PRESSURE: 47 MMHG | SYSTOLIC BLOOD PRESSURE: 103 MMHG

## 2018-01-06 LAB
ALBUMIN SERPL-MCNC: 3.5 G/DL (ref 3.4–5)
ALP SERPL-CCNC: 225 U/L (ref 46–116)
ALT SERPL-CCNC: 4005 U/L (ref 16–63)
AMPHETAMINES UR QL SCN: (no result)
AST SERPL-CCNC: 993 U/L (ref 15–37)
BASOPHILS NFR BLD: 0.6 % (ref 0–2)
BILIRUB SERPL-MCNC: 3.2 MG/DL (ref 0.2–1)
BUN SERPL-MCNC: 73 MG/DL (ref 7–18)
CALCIUM SERPL-MCNC: 8.2 MG/DL (ref 8.5–10.1)
CHLORIDE SERPL-SCNC: 96 MMOL/L (ref 98–107)
CO2 SERPL-SCNC: 7.3 MMOL/L (ref 21–32)
CREAT SERPL-MCNC: 3.7 MG/DL (ref 0.7–1.3)
ERYTHROCYTE [DISTWIDTH] IN BLOOD BY AUTOMATED COUNT: 17.6 % (ref 11.5–14.5)
GFR SERPLBLD BASED ON 1.73 SQ M-ARVRAT: 18 ML/MIN
GLUCOSE SERPL-MCNC: 6 MG/DL (ref 74–106)
MICROSCOPIC UR-IMP: YES
PLATELET # BLD: 239 X10^3MCL (ref 130–400)
POTASSIUM SERPL-SCNC: 7.2 MMOL/L (ref 3.5–5.1)
PROT SERPL-MCNC: 7.3 G/DL (ref 6.4–8.2)
RBC # UR STRIP.AUTO: (no result) /UL
SODIUM SERPL-SCNC: 130 MMOL/L (ref 136–145)
T4 FREE SERPL-MCNC: 1.49 NG/DL (ref 0.76–1.46)
UA SPECIFIC GRAVITY: >=1.03 (ref 1–1.03)

## 2022-10-21 NOTE — NUR
AAO X4.DENIES ANY PAIN/DISCOMFORT AT THE MOMENT.LUNGS CLEAR.ON SR-ST ON THE
MONITOR.IVF NS GOING AT 40 ML/HR INFUSING WELL.ON NPO STATUS FOR CARDIAC CATH
THIS AM.CALL LIGHT WITHIN REACH.WILL CONTINUE TO MONITOR PT. errror

## 2024-03-14 NOTE — NUR
Large Joint Aspiration/Injection: R subacromial bursa    Date/Time: 3/14/2024 2:15 PM    Performed by: Susu Rivas PA-C  Authorized by: Susu Rivas PA-C    Consent Done?:  Yes (Verbal)  Indications:  Pain  Site marked: the procedure site was marked    Timeout: prior to procedure the correct patient, procedure, and site was verified    Prep: patient was prepped and draped in usual sterile fashion    Approach:  Posterior  Location:  Shoulder  Site:  R subacromial bursa  Medications:  3 mg LIDOcaine HCL 20 mg/ml (2%) 20 mg/mL (2 %); 80 mg methylPREDNISolone acetate 80 mg/mL  Patient tolerance:  Patient tolerated the procedure well with no immediate complications     PT AT BEDSIDE. WILL CONTINUE TO MONITOR FOR ANY ACUTE CHANGES.